# Patient Record
Sex: FEMALE | Race: WHITE
[De-identification: names, ages, dates, MRNs, and addresses within clinical notes are randomized per-mention and may not be internally consistent; named-entity substitution may affect disease eponyms.]

---

## 2017-04-17 ENCOUNTER — HOSPITAL ENCOUNTER (INPATIENT)
Dept: HOSPITAL 45 - C.EDC | Age: 67
LOS: 4 days | Discharge: NURSING, REHAB OR LONG-TERM CARE | DRG: 470 | End: 2017-04-21
Attending: HOSPITALIST | Admitting: HOSPITALIST
Payer: COMMERCIAL

## 2017-04-17 VITALS
TEMPERATURE: 97.7 F | OXYGEN SATURATION: 99 % | DIASTOLIC BLOOD PRESSURE: 79 MMHG | SYSTOLIC BLOOD PRESSURE: 190 MMHG | HEART RATE: 69 BPM

## 2017-04-17 VITALS
TEMPERATURE: 97.52 F | SYSTOLIC BLOOD PRESSURE: 172 MMHG | HEART RATE: 72 BPM | OXYGEN SATURATION: 95 % | DIASTOLIC BLOOD PRESSURE: 94 MMHG

## 2017-04-17 VITALS
BODY MASS INDEX: 30.11 KG/M2 | HEIGHT: 64 IN | BODY MASS INDEX: 30.11 KG/M2 | HEIGHT: 64 IN | WEIGHT: 176.37 LBS | WEIGHT: 176.37 LBS

## 2017-04-17 VITALS — DIASTOLIC BLOOD PRESSURE: 78 MMHG | SYSTOLIC BLOOD PRESSURE: 170 MMHG

## 2017-04-17 DIAGNOSIS — F32.9: ICD-10-CM

## 2017-04-17 DIAGNOSIS — Z90.710: ICD-10-CM

## 2017-04-17 DIAGNOSIS — G40.909: ICD-10-CM
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
## 2017-04-17 DIAGNOSIS — Z79.899: ICD-10-CM

## 2017-04-17 DIAGNOSIS — S72.141A: Primary | ICD-10-CM

## 2017-04-17 LAB
ANION GAP SERPL CALC-SCNC: 12 MMOL/L (ref 3–11)
APPEARANCE UR: (no result)
BASOPHILS # BLD: 0.03 K/UL (ref 0–0.2)
BASOPHILS NFR BLD: 0.2 %
BILIRUB UR-MCNC: (no result) MG/DL
BUN SERPL-MCNC: 18 MG/DL (ref 7–18)
BUN/CREAT SERPL: 15.4 (ref 10–20)
CALCIUM SERPL-MCNC: 8.9 MG/DL (ref 8.5–10.1)
CHLORIDE SERPL-SCNC: 105 MMOL/L (ref 98–107)
CO2 SERPL-SCNC: 23 MMOL/L (ref 21–32)
COLOR UR: YELLOW
COMPLETE: YES
CREAT CL PREDICTED SERPL C-G-VRATE: 48.5 ML/MIN
CREAT SERPL-MCNC: 1.2 MG/DL (ref 0.6–1.2)
EOSINOPHIL NFR BLD AUTO: 228 K/UL (ref 130–400)
GLUCOSE SERPL-MCNC: 194 MG/DL (ref 70–99)
HCT VFR BLD CALC: 38.3 % (ref 37–47)
IG%: 0.5 %
IMM GRANULOCYTES NFR BLD AUTO: 9.5 %
INR PPP: 0.9 (ref 0.9–1.1)
LYMPHOCYTES # BLD: 1.42 K/UL (ref 1.2–3.4)
MANUAL MICROSCOPIC REQUIRED?: NO
MCH RBC QN AUTO: 29.2 PG (ref 25–34)
MCHC RBC AUTO-ENTMCNC: 34.7 G/DL (ref 32–36)
MCV RBC AUTO: 84.2 FL (ref 80–100)
MONOCYTES NFR BLD: 7.6 %
NEUTROPHILS # BLD AUTO: 0.7 %
NEUTROPHILS NFR BLD AUTO: 81.5 %
NITRITE UR QL STRIP: (no result)
PARTIAL THROMBOPLASTIN RATIO: 1
PH UR STRIP: 8 [PH] (ref 4.5–7.5)
PMV BLD AUTO: 9.1 FL (ref 7.4–10.4)
POTASSIUM SERPL-SCNC: 3.7 MMOL/L (ref 3.5–5.1)
PROTHROMBIN TIME: 10.1 SECONDS (ref 9–12)
RBC # BLD AUTO: 4.55 M/UL (ref 4.2–5.4)
REVIEW REQ?: NO
SODIUM SERPL-SCNC: 140 MMOL/L (ref 136–145)
SP GR UR STRIP: 1.01 (ref 1–1.03)
URINE BILL WITH OR WITHOUT MIC: (no result)
UROBILINOGEN UR-MCNC: (no result) MG/DL
WBC # BLD AUTO: 15.01 K/UL (ref 4.8–10.8)
ZZURINE CULT IF INDIC CATH: NO

## 2017-04-17 RX ADMIN — MORPHINE SULFATE PRN MG: 4 INJECTION, SOLUTION INTRAMUSCULAR; INTRAVENOUS at 23:15

## 2017-04-17 NOTE — DIAGNOSTIC IMAGING REPORT
PELVIS 1 OR 2 VIEW ROUTINE



CLINICAL HISTORY: Pelvic and right hip pain. Trauma.    



COMPARISON STUDY:  7/24/2016



FINDINGS: There is an acute intertrochanteric right hip fracture with mild varus

angulation



IMPRESSION:  Acute intertrochanteric right hip fracture. 









Electronically signed by:  Yasir Foster M.D.

4/17/2017 8:27 PM



Dictated Date/Time:  4/17/2017 8:27 PM

## 2017-04-17 NOTE — DIAGNOSTIC IMAGING REPORT
CHEST ONE VIEW PORTABLE



CLINICAL HISTORY: Hip pain. Trauma.    



COMPARISON STUDY:  9/24/2015



FINDINGS: The heart is at the upper limits of normal in size. Mild superior

mediastinal prominence is likely secondary to the AP portable technique. There

is no focal pulmonary consolidation. There is no failure. There are no pleural

effusions. No pneumothorax is visualized.[ 



IMPRESSION: No active disease in the chest.







Electronically signed by:  Yasir Foster M.D.

4/17/2017 8:27 PM



Dictated Date/Time:  4/17/2017 8:26 PM

## 2017-04-17 NOTE — DIAGNOSTIC IMAGING REPORT
RIGHT FEMUR 2 VIEWS ROUTINE



CLINICAL HISTORY: Right femur pain. Trauma.     



COMPARISON: None.



DISCUSSION: There is an acute intertrochanteric right hip fracture. The fracture

is comminuted and mildly displaced. There is varus angulation at the fracture

site. No additional femoral fractures are visualized.    



IMPRESSION: Comminuted displaced acute intertrochanteric right hip fracture.







Electronically signed by:  Yasir Foster M.D.

4/17/2017 8:28 PM



Dictated Date/Time:  4/17/2017 8:28 PM

## 2017-04-17 NOTE — HISTORY AND PHYSICAL
History & Physical


Date & Time of Service:


Apr 17, 2017 at 21:59


Chief Complaint:


Fall, Hip Pain


Primary Care Physician:


Maribel Fragoso C.RAnaNAnaPAna


History of Present Illness


Source:  patient, family


The patient is a 66-year-old female who presents to the emergency department 

complaining of severe right hip pain that began immediately after a fall when 

jogging a few hours prior to arrival.  She has no other complaint of pains for 

injury post fall.  This was a mechanical fall, the patient denies being 

lightheaded or dizzy or having palpitations prior to the event.





Past Medical/Surgical History


Medical Problems:


(1) Depression


Status: Chronic  











Family History





Patient reports no known family medical history.





Social History


Smokeless Tobacco Use:  No


Alcohol Use:  none


Drug Use:  none





Multi-Drug Resistant Organisms


History of MDRO:  No





Allergies


Coded Allergies:  


     No Known Allergies (Unverified , 9/26/08)





Home Medications


Scheduled


Fluoxetine (Prozac), 100 MG PO HS


Homeopathic Products (Zicam Cold Remedy), 1 TAB PO prn


Lamotrigine (Lamictal), 100 MG PO HS


Naproxen (Aleve), 440 MG PO prn





Review of Systems


The patient denies chest pain, palpitations, shortness of breath, cough,  

vision change, hearing change, sore throat, fevers, chills, sweats, weight 

change, fatigue, nausea, vomiting, abdominal pain, pelvic pain, blood in urine 

or stool, dysuria, urinary frequency or urgency, lightheadedness, dizziness, 

headache, memory loss, rash, abnormal bruising or bleeding, imbalance, 

generalized weakness, numbness or tingling in arms , arthralgias or myalgias, 

back or neck pain, night sweats, or allergy symptoms.


The review of systems is otherwise negative other than for that already noted 

above, and at least 10 systems have been reviewed.





Physical Exam


Vital Signs











  Date Time  Temp Pulse Resp B/P Pulse Ox O2 Delivery O2 Flow Rate FiO2


 


4/17/17 21:55  72 20 172/94 99 Room Air  


 


4/17/17 19:19 36.4 71 20 170/87 95 Room Air  








The patient is awake, well-developed and adequately nourished, alert and 

oriented 3, normocephalic and atraumatic, lying in bed and in mild to moderate 

acute distress secondary to hip pain.


HEENT--PERRL, EOMI, mucous membranes  and oropharynx normal.


Neck--supple, no JVD or bruits, thyroid normal, trachea midline, no adenopathy.


Heart--normal S1 and S2, no extra beats, no murmurs, rubs or gallops.


Lungs--clear bilaterally with good air movement, no respiratory distress, no 

accessory muscle use.


Abdomen--normal bowel sounds and soft, nontender and nondistended, no hernias 

or masses,  no organomegaly.


Extremities--no cyanosis, clubbing or edema. There are good distal pulses b/l.


Dermatologic--normal skin turgor, normal color, warm and dry, no abnormal lymph 

nodes, no rash.


Neurologic--cranial nerves II through XII grossly intact, motor and sensory 

examination normal.


Rheumatologic--painful right hip decreased range of motion with foot deviated 

outward


Psychiatric--normal affect.





Diagnostics


Laboratory Results





Results Past 24 Hours








Test


  4/17/17


20:45 4/17/17


20:48 Range/Units


 


 


Urine Color YELLOW   


 


Urine Appearance CLOUDY  CLEAR  


 


Urine pH 8.0  4.5-7.5  


 


Urine Specific Gravity 1.015  1.000-1.030  


 


Urine Protein NEG  NEG  


 


Urine Glucose (UA) NEG  NEG  


 


Urine Ketones NEG  NEG  


 


Urine Occult Blood NEG  NEG  


 


Urine Nitrite NEG  NEG  


 


Urine Bilirubin NEG  NEG  


 


Urine Urobilinogen NEG  NEG  


 


Urine Leukocyte Esterase NEG  NEG  


 


Urine WBC (Auto) 0  0-5  /hpf


 


Urine RBC (Auto) 0-4  0-4  /hpf


 


Urine Hyaline Casts (Auto) 1-5  0-5  /lpf


 


Urine Epithelial Cells (Auto) 10-20  0-5  /lpf


 


Urine Bacteria (Auto) NEG  NEG  


 


White Blood Count  15.01 4.8-10.8  K/uL


 


Red Blood Count  4.55 4.2-5.4  M/uL


 


Hemoglobin  13.3 12.0-16.0  g/dL


 


Hematocrit  38.3 37-47  %


 


Mean Corpuscular Volume  84.2   fL


 


Mean Corpuscular Hemoglobin  29.2 25-34  pg


 


Mean Corpuscular Hemoglobin


Concent 


  34.7


  32-36  g/dl


 


 


Platelet Count  228 130-400  K/uL


 


Mean Platelet Volume  9.1 7.4-10.4  fL


 


Neutrophils (%) (Auto)  81.5  %


 


Lymphocytes (%) (Auto)  9.5  %


 


Monocytes (%) (Auto)  7.6  %


 


Eosinophils (%) (Auto)  0.7  %


 


Basophils (%) (Auto)  0.2  %


 


Neutrophils # (Auto)  12.25 1.4-6.5  K/uL


 


Lymphocytes # (Auto)  1.42 1.2-3.4  K/uL


 


Monocytes # (Auto)  1.14 0.11-0.59  K/uL


 


Eosinophils # (Auto)  0.10 0-0.5  K/uL


 


Basophils # (Auto)  0.03 0-0.2  K/uL


 


RDW Standard Deviation  39.6 36.4-46.3  fL


 


RDW Coefficient of Variation  13.0 11.5-14.5  %


 


Immature Granulocyte % (Auto)  0.5  %


 


Immature Granulocyte # (Auto)  0.07 0.00-0.02  K/uL


 


Prothrombin Time


  


  10.1


  9.0-12.0


SECONDS


 


Prothromb Time International


Ratio 


  0.9


  0.9-1.1  


 


 


Activated Partial


Thromboplast Time 


  25.0


  21.0-31.0


SECONDS


 


Partial Thromboplastin Ratio  1.0  











Diagnostic Radiology





                                                                               

                                                                 


Patient Name: ANKITA BAHENA                               Unit Number:  

[id number]                  


 








 











Dictated: 04/17/17 2028


 


Transcribed: 04/17/17 2028


 


ARG


 


Printed Date/Time: [~ rep prt dt]/[~ rep prt tm]








 [~ rep ct labl] - [~ rep ct ivnm]


 





   [facility]


 Radiology Department


 [place] [address]


 [phone]





 











Dictated: 04/17/17 2028


 


Transcribed: 04/17/17 2028


 


ARG


 


Printed Date/Time: [~ rep prt dt]/[~ rep prt tm]








 [~ rep ct labl] - [~ rep ct ivnm]


 








[~ rep ct add3]]


RIGHT FEMUR 2 VIEWS ROUTINE





CLINICAL HISTORY: Right femur pain. Trauma.     





COMPARISON: None.





DISCUSSION: There is an acute intertrochanteric right hip fracture. The fracture


is comminuted and mildly displaced. There is varus angulation at the fracture


site. No additional femoral fractures are visualized.    





IMPRESSION: Comminuted displaced acute intertrochanteric right hip fracture.











Electronically signed by:  Yasir Foster M.D.


4/17/2017 8:28 PM





Dictated Date/Time:  4/17/2017 8:28 PM





 The status of this report is Signed.   


 Draft = Not yet reviewed or approved by Radiologist.  


 Signed = Reviewed and approved by Radiologist.


<AttendingPhy></AttendingPhy> <FamilyPhy>Maribel Fragoso C.R.N.P.</FamilyPhy> 

<PrimaryPhy>Maribel Fragoso C.R.NAnaP.</PrimaryPhy> <UnitNumber>[id number]</

UnitNumber> <VisitNumber>[id number]</VisitNumber> <PatientName>ANKITA BAHENA<

/PatientName> <DateOfBirth>1950</DateOfBirth> <Location>[facility]</Location> <

ServiceDate>04/17/17</ServiceDate> <MNE>ESINDI</MNE> <OrderingPhy>Puneet Cuello DO</OrderingPhy> <OrderingPhyMNE>f rep ord dr acevedo</OrderingPhyMNE> <

DictatingPhyMNE>f rep dict dr acevedo</DictatingPhyMNE> <CCListMNE>f rep ct mne</

CCListMNE> <AdmittingPhyMNE>f pt admit dr acevedo</AdmittingPhyMNE> <AttendingPhyMNE

>f pt attend dr acevedo</AttendingPhyMNE>


<ConsultingPhyMNE>f pt consult dr acevedo</ConsultingPhyMNE> <FamilyPhyMNE>f pt fam 

dr acevedo</FamilyPhyMNE> <OtherPhyMNE>f pt other dr acevedo</OtherPhyMNE> <

PrimaryPhyMNE>f pt prim care dr acevedo</PrimaryPhyMNE> <ReferringPhyMNE>f pt 

referring dr acevedo</ReferringPhyMNE>





                                                                               

                                                                 


Patient Name: ANKITA BAHENA                               Unit Number:  

[id number]                  


 








 











Dictated: 04/17/17 2026


 


Transcribed: 04/17/17 2026


 


ARG


 


Printed Date/Time: [~ rep prt dt]/[~ rep prt tm]








 [~ rep ct labl] - [~ rep ct ivnm]


 





   [facility]


 Radiology Department


 [place] [address] [phone]





 











Dictated: 04/17/17 2026


 


Transcribed: 04/17/17 2026


 


ARG


 


Printed Date/Time: [~ rep prt dt]/[~ rep prt tm]








 [~ rep ct labl] - [~ rep ct ivnm]


 








CHEST ONE VIEW PORTABLE





CLINICAL HISTORY: Hip pain. Trauma.    





COMPARISON STUDY:  9/24/2015





FINDINGS: The heart is at the upper limits of normal in size. Mild superior


mediastinal prominence is likely secondary to the AP portable technique. There


is no focal pulmonary consolidation. There is no failure. There are no pleural


effusions. No pneumothorax is visualized.[ 





IMPRESSION: No active disease in the chest.











Electronically signed by:  Yasir Foster M.D.


4/17/2017 8:27 PM





Dictated Date/Time:  4/17/2017 8:26 PM





 The status of this report is Signed.   


 Draft = Not yet reviewed or approved by Radiologist.  


 Signed = Reviewed and approved by Radiologist.


<AttendingPhy></AttendingPhy> <FamilyPhy>Maribel Fragoso, C.R.N.P.</FamilyPhy> 

<PrimaryPhy>Maribel Fragoso, C.R.N.P.</PrimaryPhy> <UnitNumber>[id number]</

UnitNumber> <VisitNumber>[id number]</VisitNumber> <PatientName>ANKITA BAHENA<

/PatientName> <DateOfBirth>1950</DateOfBirth> <Location>[facility]</Location> <

ServiceDate>04/17/17</ServiceDate> <MNE>ESINDI</MNE> <OrderingPhy>Puneet Cuello DO</OrderingPhy> <OrderingPhyMNE>f rep ord dr acevedo</OrderingPhyMNE> <

DictatingPhyMNE>f rep dict dr acevedo</DictatingPhyMNE> <CCListMNE>f rep ct mne</

CCListMNE> <AdmittingPhyMNE>f pt admit dr acevedo</AdmittingPhyMNE> <AttendingPhyMNE

>f pt attend dr acevedo</AttendingPhyMNE>


<ConsultingPhyMNE>f pt consult dr acevedo</ConsultingPhyMNE> <FamilyPhyMNE>f pt fam 

dr acevedo</FamilyPhyMNE> <OtherPhyMNE>f pt other dr acevedo</OtherPhyMNE> <

PrimaryPhyMNE>f pt prim care dr acevedo</PrimaryPhyMNE> <ReferringPhyMNE>f pt 

referring dr acevedo</ReferringPhyMNE>





                                                                               

                                                                 


Patient Name: ANKITA BAHENA                               Unit Number:  

[id number]                  


 








 











Dictated: 04/17/17 2027


 


Transcribed: 04/17/17 2027


 


ARG


 


Printed Date/Time: [~ rep prt dt]/[~ rep prt tm]








 [~ rep ct labl] - [~ rep ct ivnm]


 





   [facility]


 Radiology Department


 [place] [id number] [phone]





 











Dictated: 04/17/17 2027


 


Transcribed: 04/17/17 2027


 


ARG


 


Printed Date/Time: [~ rep prt dt]/[~ rep prt tm]








 [~ rep ct labl] - [~ rep ct ivnm]


 








[~ rep ct add3]]


PELVIS 1 OR 2 VIEW ROUTINE





CLINICAL HISTORY: Pelvic and right hip pain. Trauma.    





COMPARISON STUDY:  7/24/2016





FINDINGS: There is an acute intertrochanteric right hip fracture with mild varus


angulation





IMPRESSION:  Acute intertrochanteric right hip fracture. 














Electronically signed by:  Yasir Foster M.D.


4/17/2017 8:27 PM





Dictated Date/Time:  4/17/2017 8:27 PM





 The status of this report is Signed.   


 Draft = Not yet reviewed or approved by Radiologist.  


 Signed = Reviewed and approved by Radiologist.


<AttendingPhy></AttendingPhy> <FamilyPhy>Maribel Fragoso C.R.N.P.</FamilyPhy> 

<PrimaryPhy>Maribel Fragoso C.RAnaN.P.</PrimaryPhy> <UnitNumber>[id number]</

UnitNumber> <VisitNumber>[id number]</VisitNumber> <PatientName>ANKITA BAHENA<

/PatientName> <DateOfBirth>1950</DateOfBirth> <[facility] <

ServiceDate>04/17/17</ServiceDate> <MNE>ESINDI</MNE> <OrderingPhy>CuelloPuneet ryan 

CARLY DO</OrderingPhy> <OrderingPhyMNE>f rep ord dr acevedo</OrderingPhyMNE> <

DictatingPhyMNE>f rep dict dr acevedo</DictatingPhyMNE> <CCListMNE>f rep ct mne</

CCListMNE> <AdmittingPhyMNE>f pt admit dr acevedo</AdmittingPhyMNE> <AttendingPhyMNE

>f pt attend dr acevedo</AttendingPhyMNE>


<ConsultingPhyMNE>f pt consult dr acevedo</ConsultingPhyMNE> <FamilyPhyMNE>f pt fam 

dr acevedo</FamilyPhyMNE> <OtherPhyMNE>f pt other dr acevedo</OtherPhyMNE> <

PrimaryPhyMNE>f pt prim care dr acevedo</PrimaryPhyMNE> <ReferringPhyMNE>f pt 

referring dr acevedo</ReferringPhyMNE>





EKG


EKG shows normal sinus rhythm at 78 bpm, there are no acute ST-T changes.





Impression


Assessment and Plan


Closed right hip fracture--the patient be admitted to the medical surgical 

floor.  She'll be made nothing by mouth after midnight.  Place on Zofran 4 mg 

IV every 6 hours when necessary, Protonix 40 mg IV daily, and morphine sulfate 2

-4 mg IV every 2 hours when necessary.  Consult Dr. Rudy Gomez from 

orthopedics.  Place on normal saline with KCl 20 mEq at 100 ML's per hour.





Depression--continue fluoxetine 100 mg by mouth at bedtime and lamotrigine 100 

mg by mouth at bedtime.





Level of Care


Med/Surg





Advanced Directives


Existing Advance Directive:  No


Existing Living Will:  No


Existing Power of :  No





Resuscitation Status


FULL RESUSCITATION





VTE Prophylaxis


VTE Risk Assessment Done? Y/N:  Yes


Risk Level:  Moderate


Given or contraindicated:  SCD's

## 2017-04-17 NOTE — EMERGENCY ROOM VISIT NOTE
History


Report prepared by Navneet:  Janie Shay


Under the Supervision of:  Dr. Puneet Cuello D.O.


First contact with patient:  19:18


Chief Complaint:  HIP PAIN


Stated Complaint:  FALL, HIP PAIN





History of Present Illness


The patient is a 66 year old female who presents to the Emergency Room with 

complaints of severe and persistent left hip pain starting a few hours ago. The 

patient was jogging when she fell and landed on her left hip. She denies 

hitting her head. She has been having the pain since her fall. She has 

worsening pain with movement. Pt denies headache, change in vision, neck pain, 

fevers, chest pain, shortness of breath, nausea, vomiting, diarrhea, pain with 

urination, and melena. She denies any blood thinners. She does not have any 

medical problems.





   Source of History:  patient


   Onset:  a few hours ago


   Position:  other (left hip)


   Symptom Intensity:  severe


   Timing:  other (persistent)


   Modifying Factors (Worsening):  movement


   Associated Symptoms:  No SOB, No chest pain, No diarrhea, No fevers, No 

headache, No nausea, No neck pain, No vomiting





Review of Systems


See HPI for pertinent positives & negatives. A total of 10 systems reviewed and 

were otherwise negative.





Past Medical & Surgical


Medical Problems:


(1) Closed right hip fracture


(2) Depression








Family History





Patient reports no known family medical history.





Social History


Marital Status:  


Occupation Status:  retired





Current/Historical Medications


Scheduled


Fluoxetine (Prozac), 100 MG PO HS


Homeopathic Products (Zicam Cold Remedy), 1 TAB PO prn


Lamotrigine (Lamictal), 100 MG PO HS


Naproxen (Aleve), 440 MG PO prn





Allergies


Coded Allergies:  


     No Known Allergies (Unverified , 9/26/08)





Physical Exam


Vital Signs











  Date Time  Temp Pulse Resp B/P Pulse Ox O2 Delivery O2 Flow Rate FiO2


 


4/17/17 21:55  72 20 172/94 99 Room Air  


 


4/17/17 19:19 36.4 71 20 170/87 95 Room Air  











Physical Exam


GENERAL: Laying on her left side, in moderate distress, holding her right hip. 


HEAD: Normocephalic, atraumatic. 


EYE EXAM: normal conjunctiva, PERRL and EOM's grossly intact


OROPHARYNX: no exudate, no erythema, lips, buccal mucosa, and tongue normal and 

mucous membranes are moist


NECK: supple, no nuchal rigidity, no adenopathy, non-tender


CHEST: stable to compression anteriorly posteriorly 


LUNGS: Clear to auscultation. Normal chest wall mechanics


HEART: no murmurs, S1 normal and S2 normal 


ABDOMEN: abdomen soft, non-tender, normo-active bowel sounds, no masses, no 

rebound or guarding. 


BACK: Back is symmetrical on inspection and there is no deformity, no midline 

tenderness, no CVA tenderness. 


SKIN: Small abrasions on bilateral lower extremities


UPPER EXTREMITIES: upper extremities are grossly normal. 


LOWER EXTREMITIES: No pitting edema. Right lower extremity with significant 

pain with palpation of the hip. DP 2/4. Gross sensations intact. Right lower 

extremity is shortened. Small abrasions on bilateral feet.  No pain on 

palpation of the left lower extremity.


NEURO EXAM: Normal sensorium, cranial nerves II-XII grossly intact, normal 

speech,  no gross weakness of arms, no gross weakness of legs.





Medical Decision & Procedures


ER Provider


Diagnostic Interpretation:


Xray results as interpreted by me and the radiologist:





CHEST ONE VIEW PORTABLE





CLINICAL HISTORY: Hip pain. Trauma.    





COMPARISON STUDY:  9/24/2015





FINDINGS: The heart is at the upper limits of normal in size. Mild superior


mediastinal prominence is likely secondary to the AP portable technique. There


is no focal pulmonary consolidation. There is no failure. There are no pleural


effusions. No pneumothorax is visualized.[ 





IMPRESSION: No active disease in the chest.











Electronically signed by:  Yasir Foster M.D.


4/17/2017 8:27 PM





Dictated Date/Time:  4/17/2017 8:26 PM








PELVIS 1 OR 2 VIEW ROUTINE





CLINICAL HISTORY: Pelvic and right hip pain. Trauma.    





COMPARISON STUDY:  7/24/2016





FINDINGS: There is an acute intertrochanteric right hip fracture with mild varus


angulation





IMPRESSION:  Acute intertrochanteric right hip fracture. 














Electronically signed by:  Yasir Foster M.D.


4/17/2017 8:27 PM





Dictated Date/Time:  4/17/2017 8:27 PM








RIGHT FEMUR 2 VIEWS ROUTINE





CLINICAL HISTORY: Right femur pain. Trauma.     





COMPARISON: None.





DISCUSSION: There is an acute intertrochanteric right hip fracture. The fracture


is comminuted and mildly displaced. There is varus angulation at the fracture


site. No additional femoral fractures are visualized.    





IMPRESSION: Comminuted displaced acute intertrochanteric right hip fracture.











Electronically signed by:  Yasir Foster M.D.


4/17/2017 8:28 PM





Dictated Date/Time:  4/17/2017 8:28 PM





Laboratory Results


4/17/17 20:48








Red Blood Count 4.55, Mean Corpuscular Volume 84.2, Mean Corpuscular Hemoglobin 

29.2, Mean Corpuscular Hemoglobin Concent 34.7, Mean Platelet Volume 9.1, 

Neutrophils (%) (Auto) 81.5, Lymphocytes (%) (Auto) 9.5, Monocytes (%) (Auto) 

7.6, Eosinophils (%) (Auto) 0.7, Basophils (%) (Auto) 0.2, Neutrophils # (Auto) 

12.25, Lymphocytes # (Auto) 1.42, Monocytes # (Auto) 1.14, Eosinophils # (Auto) 

0.10, Basophils # (Auto) 0.03





4/17/17 20:48

















Test


  4/17/17


20:45 4/17/17


20:48


 


Urine Color YELLOW  


 


Urine Appearance CLOUDY (CLEAR)  


 


Urine pH 8.0 (4.5-7.5)  


 


Urine Specific Gravity


  1.015


(1.000-1.030) 


 


 


Urine Protein NEG (NEG)  


 


Urine Glucose (UA) NEG (NEG)  


 


Urine Ketones NEG (NEG)  


 


Urine Occult Blood NEG (NEG)  


 


Urine Nitrite NEG (NEG)  


 


Urine Bilirubin NEG (NEG)  


 


Urine Urobilinogen NEG (NEG)  


 


Urine Leukocyte Esterase NEG (NEG)  


 


Urine WBC (Auto) 0 /hpf (0-5)  


 


Urine RBC (Auto) 0-4 /hpf (0-4)  


 


Urine Hyaline Casts (Auto) 1-5 /lpf (0-5)  


 


Urine Epithelial Cells (Auto)


  10-20 /lpf


(0-5) 


 


 


Urine Bacteria (Auto) NEG (NEG)  


 


White Blood Count


  


  15.01 K/uL


(4.8-10.8)


 


Red Blood Count


  


  4.55 M/uL


(4.2-5.4)


 


Hemoglobin


  


  13.3 g/dL


(12.0-16.0)


 


Hematocrit  38.3 % (37-47) 


 


Mean Corpuscular Volume


  


  84.2 fL


()


 


Mean Corpuscular Hemoglobin


  


  29.2 pg


(25-34)


 


Mean Corpuscular Hemoglobin


Concent 


  34.7 g/dl


(32-36)


 


Platelet Count


  


  228 K/uL


(130-400)


 


Mean Platelet Volume


  


  9.1 fL


(7.4-10.4)


 


Neutrophils (%) (Auto)  81.5 % 


 


Lymphocytes (%) (Auto)  9.5 % 


 


Monocytes (%) (Auto)  7.6 % 


 


Eosinophils (%) (Auto)  0.7 % 


 


Basophils (%) (Auto)  0.2 % 


 


Neutrophils # (Auto)


  


  12.25 K/uL


(1.4-6.5)


 


Lymphocytes # (Auto)


  


  1.42 K/uL


(1.2-3.4)


 


Monocytes # (Auto)


  


  1.14 K/uL


(0.11-0.59)


 


Eosinophils # (Auto)


  


  0.10 K/uL


(0-0.5)


 


Basophils # (Auto)


  


  0.03 K/uL


(0-0.2)


 


RDW Standard Deviation


  


  39.6 fL


(36.4-46.3)


 


RDW Coefficient of Variation


  


  13.0 %


(11.5-14.5)


 


Immature Granulocyte % (Auto)  0.5 % 


 


Immature Granulocyte # (Auto)


  


  0.07 K/uL


(0.00-0.02)


 


Prothrombin Time


  


  10.1 SECONDS


(9.0-12.0)


 


Prothromb Time International


Ratio 


  0.9 (0.9-1.1) 


 


 


Activated Partial


Thromboplast Time 


  25.0 SECONDS


(21.0-31.0)


 


Partial Thromboplastin Ratio  1.0 


 


Anion Gap


  


  12.0 mmol/L


(3-11)


 


Est Creatinine Clear Calc


Drug Dose 


  48.5 ml/min 


 


 


Estimated GFR (


American) 


  55.3 


 


 


Estimated GFR (Non-


American 


  47.7 


 


 


BUN/Creatinine Ratio  15.4 (10-20) 


 


Calcium Level


  


  8.9 mg/dl


(8.5-10.1)














Laboratory results per my review.





Medications Administered











 Medications


  (Trade)  Dose


 Ordered  Sig/Saima


 Route  Start Time


 Stop Time Status Last Admin


Dose Admin


 


 Ondansetron HCl


  (Zofran Inj)  4 mg  NOW  STAT


 IV  4/17/17 19:24


 4/17/17 19:27 DC 4/17/17 19:32


4 MG


 


 Morphine Sulfate


  (MoRPHine


 SULFATE INJ)  2 mg  STK-MED ONCE


 .ROUTE  4/17/17 19:35


 4/17/17 19:36 DC 4/17/17 19:32


2 MG


 


 Morphine Sulfate


  (MoRPHine


 SULFATE INJ)  4 mg  STK-MED ONCE


 .ROUTE  4/17/17 19:36


 4/17/17 19:37 DC 4/17/17 19:31


4 MG











ED Course


ED COURSE: 


Vital signs were reviewed and showed hypertensive. 


The patients medical record was reviewed


The above diagnostic studies were performed and reviewed.


ED treatments and interventions as stated above. 





1918: The patient was evaluated in room C04. A complete history and physical 

examination was performed.





2024: Upon reevaluation, the patient is resting comfortably.I discussed my 

findings with the patient and she understands and agrees with the treatment 

plan.   


Based on the patients age, coexisting illnesses, exam and lab findings the 

decision to treat as an inpatient was made.


The patient remained stable while under my care.


The patient will be evaluated for further management.





2032: I discussed the patient's case with Dr. Gomez, orthopedic surgeon with 

[facility]. 





2044: I discussed the patient's case with Dr. Drummond, from [facility].





Medical Decision


Differential diagnoses include major intracranial, cervical, spinal, thoracic, 

abdominal, pelvic and neurologic injury.  Fracture, contusion, sprain, strain, 

laceration, abrasions included as well. 





Patient is a 66-year-old female who presents to the ER following a mechanical 

fall walking. Denies any trauma to her head or any other complaints.  No blood 

thinners.  She is in significant pain in her right hip.  X-ray show right hip 

fracture.  Discussed this with orthopedics.  Labs were obtained per the hip 

fracture protocol.  White count was 15,000 likely secondary to pain.  BMP was 

unremarkable.  UA was unremarkable.  INR was normal.  Patient was given IV 

morphine and admitted to internal medicine for right hip fx.





Consults


Time Called:  2023


Consulting Physician:  Dr. Gomez, orthopedic surgeon with [facility]


Returned Call:  2032


I discussed the patient's case with Dr. Gomez, orthopedic surgeon with 

[facility].


Additional Consults:  


   Time Called:  2040


   Consulted Physician:  Dr. Drummond, from [facility]


   Returned Call:  2044


Additional Comments:


I discussed the patient's case with Dr. Drummond, from [facility].





Impression





 Primary Impression:  


 Hip fracture, right





Scribe Attestation


The scribe's documentation has been prepared under my direction and personally 

reviewed by me in its entirety. I confirm that the note above accurately 

reflects all work, treatment, procedures, and medical decision making performed 

by me.





Departure Information


Dispostion


Being Evaluated By Hospitalist





Maribel Crockett C.R.NAnaPAna (PCP)





Patient Instructions


My [facility]





Problem Qualifiers








 Primary Impression:  


 Hip fracture, right


 Encounter type:  initial encounter  Fracture type:  closed  Qualified Codes:  

S72.001A - Fracture of unspecified part of neck of right femur, initial 

encounter for closed fracture

## 2017-04-18 VITALS — HEART RATE: 85 BPM | DIASTOLIC BLOOD PRESSURE: 79 MMHG | SYSTOLIC BLOOD PRESSURE: 158 MMHG

## 2017-04-18 VITALS
DIASTOLIC BLOOD PRESSURE: 84 MMHG | OXYGEN SATURATION: 97 % | SYSTOLIC BLOOD PRESSURE: 162 MMHG | HEART RATE: 83 BPM | TEMPERATURE: 98.06 F

## 2017-04-18 VITALS
TEMPERATURE: 97.88 F | SYSTOLIC BLOOD PRESSURE: 185 MMHG | DIASTOLIC BLOOD PRESSURE: 93 MMHG | OXYGEN SATURATION: 97 % | HEART RATE: 96 BPM

## 2017-04-18 VITALS
HEART RATE: 90 BPM | DIASTOLIC BLOOD PRESSURE: 74 MMHG | OXYGEN SATURATION: 95 % | TEMPERATURE: 97.52 F | SYSTOLIC BLOOD PRESSURE: 121 MMHG

## 2017-04-18 VITALS
SYSTOLIC BLOOD PRESSURE: 177 MMHG | OXYGEN SATURATION: 95 % | TEMPERATURE: 97.88 F | DIASTOLIC BLOOD PRESSURE: 92 MMHG | HEART RATE: 98 BPM

## 2017-04-18 VITALS
DIASTOLIC BLOOD PRESSURE: 108 MMHG | TEMPERATURE: 98.42 F | HEART RATE: 102 BPM | SYSTOLIC BLOOD PRESSURE: 183 MMHG | OXYGEN SATURATION: 96 %

## 2017-04-18 VITALS
DIASTOLIC BLOOD PRESSURE: 84 MMHG | HEART RATE: 94 BPM | TEMPERATURE: 97.88 F | SYSTOLIC BLOOD PRESSURE: 164 MMHG | OXYGEN SATURATION: 95 %

## 2017-04-18 VITALS
TEMPERATURE: 97.52 F | OXYGEN SATURATION: 98 % | DIASTOLIC BLOOD PRESSURE: 95 MMHG | SYSTOLIC BLOOD PRESSURE: 175 MMHG | HEART RATE: 96 BPM

## 2017-04-18 LAB
ANION GAP SERPL CALC-SCNC: 8 MMOL/L (ref 3–11)
BASOPHILS # BLD: 0.03 K/UL (ref 0–0.2)
BASOPHILS NFR BLD: 0.3 %
BUN SERPL-MCNC: 16 MG/DL (ref 7–18)
BUN/CREAT SERPL: 14.8 (ref 10–20)
CALCIUM SERPL-MCNC: 8.7 MG/DL (ref 8.5–10.1)
CHLORIDE SERPL-SCNC: 107 MMOL/L (ref 98–107)
CO2 SERPL-SCNC: 26 MMOL/L (ref 21–32)
COMPLETE: YES
CREAT CL PREDICTED SERPL C-G-VRATE: 52.9 ML/MIN
CREAT SERPL-MCNC: 1.1 MG/DL (ref 0.6–1.2)
EOSINOPHIL NFR BLD AUTO: 223 K/UL (ref 130–400)
GLUCOSE SERPL-MCNC: 141 MG/DL (ref 70–99)
HCT VFR BLD CALC: 36.3 % (ref 37–47)
IG%: 0.4 %
IMM GRANULOCYTES NFR BLD AUTO: 11 %
LYMPHOCYTES # BLD: 1.25 K/UL (ref 1.2–3.4)
MAGNESIUM SERPL-MCNC: 2.4 MG/DL (ref 1.8–2.4)
MCH RBC QN AUTO: 28.7 PG (ref 25–34)
MCHC RBC AUTO-ENTMCNC: 33.3 G/DL (ref 32–36)
MCV RBC AUTO: 86.2 FL (ref 80–100)
MONOCYTES NFR BLD: 11.6 %
NEUTROPHILS # BLD AUTO: 0.2 %
NEUTROPHILS NFR BLD AUTO: 76.5 %
PMV BLD AUTO: 9.1 FL (ref 7.4–10.4)
POTASSIUM SERPL-SCNC: 4.2 MMOL/L (ref 3.5–5.1)
RBC # BLD AUTO: 4.21 M/UL (ref 4.2–5.4)
SODIUM SERPL-SCNC: 141 MMOL/L (ref 136–145)
WBC # BLD AUTO: 11.34 K/UL (ref 4.8–10.8)

## 2017-04-18 PROCEDURE — 0SR90J9 REPLACEMENT OF RIGHT HIP JOINT WITH SYNTHETIC SUBSTITUTE, CEMENTED, OPEN APPROACH: ICD-10-PCS | Performed by: ORTHOPAEDIC SURGERY

## 2017-04-18 RX ADMIN — TRANEXAMIC ACID SCH MLS/HR: 100 INJECTION, SOLUTION INTRAVENOUS at 18:05

## 2017-04-18 RX ADMIN — SODIUM CHLORIDE AND POTASSIUM CHLORIDE SCH MLS/HR: 9; 1.49 INJECTION, SOLUTION INTRAVENOUS at 08:57

## 2017-04-18 RX ADMIN — SODIUM CHLORIDE AND POTASSIUM CHLORIDE SCH MLS/HR: 9; 1.49 INJECTION, SOLUTION INTRAVENOUS at 19:07

## 2017-04-18 RX ADMIN — LORAZEPAM PRN MLS/MIN: 2 INJECTION INTRAMUSCULAR; INTRAVENOUS at 21:49

## 2017-04-18 RX ADMIN — MORPHINE SULFATE PRN MG: 4 INJECTION, SOLUTION INTRAMUSCULAR; INTRAVENOUS at 07:55

## 2017-04-18 RX ADMIN — FLUOXETINE SCH MG: 20 CAPSULE ORAL at 21:16

## 2017-04-18 RX ADMIN — LORAZEPAM PRN MLS/MIN: 2 INJECTION INTRAMUSCULAR; INTRAVENOUS at 10:50

## 2017-04-18 RX ADMIN — TRANEXAMIC ACID SCH MLS/HR: 100 INJECTION, SOLUTION INTRAVENOUS at 18:06

## 2017-04-18 RX ADMIN — OXYCODONE HYDROCHLORIDE PRN MG: 5 TABLET ORAL at 18:25

## 2017-04-18 RX ADMIN — MORPHINE SULFATE PRN MG: 4 INJECTION, SOLUTION INTRAMUSCULAR; INTRAVENOUS at 03:03

## 2017-04-18 RX ADMIN — MORPHINE SULFATE PRN MG: 4 INJECTION, SOLUTION INTRAMUSCULAR; INTRAVENOUS at 00:49

## 2017-04-18 RX ADMIN — SODIUM CHLORIDE AND POTASSIUM CHLORIDE SCH MLS/HR: 9; 1.49 INJECTION, SOLUTION INTRAVENOUS at 00:31

## 2017-04-18 RX ADMIN — HYDROMORPHONE HYDROCHLORIDE PRN MG: 1 INJECTION, SOLUTION INTRAMUSCULAR; INTRAVENOUS; SUBCUTANEOUS at 19:07

## 2017-04-18 RX ADMIN — HYDROMORPHONE HYDROCHLORIDE PRN MG: 1 INJECTION, SOLUTION INTRAMUSCULAR; INTRAVENOUS; SUBCUTANEOUS at 20:08

## 2017-04-18 RX ADMIN — MORPHINE SULFATE PRN MG: 4 INJECTION, SOLUTION INTRAMUSCULAR; INTRAVENOUS at 09:57

## 2017-04-18 RX ADMIN — OXYCODONE HYDROCHLORIDE PRN MG: 5 TABLET ORAL at 23:41

## 2017-04-18 RX ADMIN — PANTOPRAZOLE SODIUM SCH MLS/MIN: 40 INJECTION, POWDER, FOR SOLUTION INTRAVENOUS at 10:51

## 2017-04-18 RX ADMIN — MORPHINE SULFATE PRN MG: 4 INJECTION, SOLUTION INTRAMUSCULAR; INTRAVENOUS at 05:18

## 2017-04-18 NOTE — ANESTHESIOLOGY PROGRESS NOTE
Anesthesia Post Op Note


Date & Time


Apr 18, 2017 at 17:28





Vital Signs


Pain Intensity:  3





 Vital Signs Past 12 Hours








  Date Time  Temp Pulse Resp B/P Pulse Ox O2 Delivery O2 Flow Rate FiO2


 


4/18/17 17:20  98 18 133/73 96 Nasal Cannula 3 


 


4/18/17 17:10 36.4 97 20 119/71 96 Nasal Cannula 3 


 


4/18/17 17:00  97 18 122/65 97 Mask 10 


 


4/18/17 16:50  96 18 125/72 97 Mask 10 


 


4/18/17 16:43 36.8 95 16 110/64 98 Mask 10 


 


4/18/17 07:25 36.7 83 16 162/84 97  2.0 


 


4/18/17 07:15      Nasal Cannula 2.0 











Notes


Mental Status:  alert / awake / arousable, participated in evaluation


Pt Amnestic to Procedure:  Yes


Nausea / Vomiting:  adequately controlled


Pain:  adequately controlled


Airway Patency, RR, SpO2:  stable & adequate


BP & HR:  stable & adequate


Hydration State:  stable & adequate


Anesthetic Complications:  no major complications apparent

## 2017-04-18 NOTE — [facility] POST OPERATIVE BRIEF NOTE
Immediate Operative Summary


Operative Date


Apr 18, 2017.





Pre-Operative Diagnosis





severe right hip fracture





Post-Operative Diagnosis





severe right hip fracture





Procedure(s) Performed





Right Cemented Hip Replacement Constrained Acetabular Cup, Greater Trochater 


Repair





Surgeon


Dr. Jong Gomez





Assistant Surgeon(s)


Matthew Torres PA-C





Estimated Blood Loss


300ML





Findings


severe comminution





Specimens





A. Right femoral head





Complication(s)


None





Disposition


Recovery Room / PACU

## 2017-04-18 NOTE — HOSPITALIST PROGRESS NOTE
Hospitalist Progress Note


Date of Service


Apr 18, 2017.





Subjective


Pt evaluation today including:  conversation w/ patient


patient with post op pain at site of surgery





Medications











 Medications


  (Trade)  Dose


 Ordered  Sig/Saima


 Route  Start Time


 Stop Time Status Last Admin


Dose Admin


 


 Ondansetron HCl


  (Zofran Inj)  4 mg  NOW  STAT


 IV  4/17/17 19:24


 4/17/17 19:27 DC 4/17/17 19:32


4 MG


 


 Morphine Sulfate


  (MoRPHine


 SULFATE INJ)  2 mg  STK-MED ONCE


 .ROUTE  4/17/17 19:35


 4/17/17 19:36 DC 4/17/17 19:32


2 MG


 


 Morphine Sulfate


  (MoRPHine


 SULFATE INJ)  4 mg  STK-MED ONCE


 .ROUTE  4/17/17 19:36


 4/17/17 19:37 DC 4/17/17 19:31


4 MG


 


 Ondansetron HCl 4


 mg  4 mg  Q6H  PRN


 IV  4/17/17 22:00


 5/17/17 21:59  4/17/17 23:14


4 MG


 


 Potassium


 Chloride/Sodium


 Chloride


  (Nss + 20meq KCl


 1000ml)  1,000 ml @ 


 100 mls/hr  Q10H


 IV  4/17/17 23:15


 5/17/17 23:14  4/18/17 08:57


100 MLS/HR


 


 Morphine Sulfate


 4 mg  4 mg  Q2H  PRN


 IV  4/17/17 22:00


 4/18/17 17:56 DC 4/18/17 09:57


4 MG


 


 Pantoprazole


 Sodium 40 mg/


 Syringe  10 ml @ 5


 mls/min  DAILY@11


 IV  4/18/17 11:00


 5/18/17 10:59  4/18/17 10:51


5 MLS/MIN


 


 Lorazepam/Syringe


  (Ativan Inj/


 Syringe)  1 ml @ 0.5


 mls/min  TID  PRN


 IV  4/18/17 10:00


 5/18/17 09:59  4/18/17 10:50


0.5 MLS/MIN


 


 Cefazolin Sodium


 2000 mg  2,000 mg  STK-MED ONCE


 IV  4/18/17 13:25


 4/18/17 13:26 DC 4/18/17 13:53


2,000 MG


 


 Polymyxin B


 Sulfate 680627


 units/Sodium


 Chloride  102 ml @ 0


 mls/hr  TODAY@1400


 IR  4/18/17 14:00


 4/18/17 14:01 DC 4/18/17 16:17


102 MLS/HR


 


 Vancomycin HCl/


 Sodium Chloride


  (Vancomycin Inj/


 Nss 100ml)  108 ml @ 0


 mls/hr  TODAY@1400


 IR  4/18/17 14:00


 4/18/17 14:01 DC 4/18/17 16:15


108 MLS/HR


 


 Bacitracin


  (Bacitracin


 50,000 Units)  50,000 units  ONE  ONCE


 IR  4/18/17 16:22


 4/18/17 16:25 DC 4/18/17 16:25


50,000 UNITS











Objective


Vital Signs











  Date Time  Temp Pulse Resp B/P Pulse Ox O2 Delivery O2 Flow Rate FiO2


 


4/18/17 18:23 36.6 94 16 164/84 95 Nasal Cannula 2.0 


 


4/18/17 17:35  97 16 143/74 97 Nasal Cannula 3 


 


4/18/17 17:20  98 18 133/73 96 Nasal Cannula 3 


 


4/18/17 17:10 36.4 97 20 119/71 96 Nasal Cannula 3 


 


4/18/17 17:00  97 18 122/65 97 Mask 10 


 


4/18/17 16:50  96 18 125/72 97 Mask 10 


 


4/18/17 16:43 36.8 95 16 110/64 98 Mask 10 


 


4/18/17 07:25 36.7 83 16 162/84 97  2.0 


 


4/18/17 07:15      Nasal Cannula 2.0 


 


4/18/17 01:11  85  158/79    


 


4/17/17 23:48    170/78    


 


4/17/17 23:22      Room Air  


 


4/17/17 23:00 36.5 69 16 190/79 99 Nasal Cannula 2.0 


 


4/17/17 22:35 36.4 72 20 172/94 95 Nasal Cannula 2.0 


 


4/17/17 22:20     95 Nasal Cannula 2.0 


 


4/17/17 22:20     87 Room Air  


 


4/17/17 21:55  72 20 172/94 99 Room Air  


 


4/17/17 19:19 36.4 71 20 170/87 95 Room Air  











Physical Exam


General Appearance:  WD/WN


Eyes:  normal inspection


ENT:  hearing grossly normal


Neck:  trachea midline


Respiratory/Chest:  lungs clear


Cardiovascular:  regular rate, rhythm


Abdomen:  normal bowel sounds





Laboratory Results





Last 24 Hours








Test


  4/17/17


20:45 4/17/17


20:48 4/18/17


05:52 4/18/17


06:50


 


Urine Color YELLOW    


 


Urine Appearance CLOUDY    


 


Urine pH 8.0    


 


Urine Specific Gravity 1.015    


 


Urine Protein NEG    


 


Urine Glucose (UA) NEG    


 


Urine Ketones NEG    


 


Urine Occult Blood NEG    


 


Urine Nitrite NEG    


 


Urine Bilirubin NEG    


 


Urine Urobilinogen NEG    


 


Urine Leukocyte Esterase NEG    


 


Urine WBC (Auto) 0 /hpf    


 


Urine RBC (Auto) 0-4 /hpf    


 


Urine Hyaline Casts (Auto) 1-5 /lpf    


 


Urine Epithelial Cells (Auto) 10-20 /lpf    


 


Urine Bacteria (Auto) NEG    


 


White Blood Count  15.01 K/uL   11.34 K/uL 


 


Red Blood Count  4.55 M/uL   4.21 M/uL 


 


Hemoglobin  13.3 g/dL   12.1 g/dL 


 


Hematocrit  38.3 %   36.3 % 


 


Mean Corpuscular Volume  84.2 fL   86.2 fL 


 


Mean Corpuscular Hemoglobin  29.2 pg   28.7 pg 


 


Mean Corpuscular Hemoglobin


Concent 


  34.7 g/dl 


  


  33.3 g/dl 


 


 


Platelet Count  228 K/uL   223 K/uL 


 


Mean Platelet Volume  9.1 fL   9.1 fL 


 


Neutrophils (%) (Auto)  81.5 %   76.5 % 


 


Lymphocytes (%) (Auto)  9.5 %   11.0 % 


 


Monocytes (%) (Auto)  7.6 %   11.6 % 


 


Eosinophils (%) (Auto)  0.7 %   0.2 % 


 


Basophils (%) (Auto)  0.2 %   0.3 % 


 


Neutrophils # (Auto)  12.25 K/uL   8.69 K/uL 


 


Lymphocytes # (Auto)  1.42 K/uL   1.25 K/uL 


 


Monocytes # (Auto)  1.14 K/uL   1.31 K/uL 


 


Eosinophils # (Auto)  0.10 K/uL   0.02 K/uL 


 


Basophils # (Auto)  0.03 K/uL   0.03 K/uL 


 


RDW Standard Deviation  39.6 fL   42.7 fL 


 


RDW Coefficient of Variation  13.0 %   13.4 % 


 


Immature Granulocyte % (Auto)  0.5 %   0.4 % 


 


Immature Granulocyte # (Auto)  0.07 K/uL   0.04 K/uL 


 


Prothrombin Time  10.1 SECONDS   


 


Prothromb Time International


Ratio 


  0.9 


  


  


 


 


Activated Partial


Thromboplast Time 


  25.0 SECONDS 


  


  


 


 


Partial Thromboplastin Ratio  1.0   


 


Sodium Level  140 mmol/L  141 mmol/L  


 


Potassium Level  3.7 mmol/L  4.2 mmol/L  


 


Chloride Level  105 mmol/L  107 mmol/L  


 


Carbon Dioxide Level  23 mmol/L  26 mmol/L  


 


Anion Gap  12.0 mmol/L  8.0 mmol/L  


 


Blood Urea Nitrogen  18 mg/dl  16 mg/dl  


 


Creatinine  1.20 mg/dl  1.10 mg/dl  


 


Est Creatinine Clear Calc


Drug Dose 


  48.5 ml/min 


  52.9 ml/min 


  


 


 


Estimated GFR (


American) 


  55.3 


  61.4 


  


 


 


Estimated GFR (Non-


American 


  47.7 


  53.0 


  


 


 


BUN/Creatinine Ratio  15.4  14.8  


 


Random Glucose  194 mg/dl  141 mg/dl  


 


Calcium Level  8.9 mg/dl  8.7 mg/dl  


 


Magnesium Level   2.4 mg/dl  


 


Chemistry Specimen Hemolysis     











Assessment and Plan





(1) Closed right hip fracture


Assessment & Plan:  Post op care with dvt prop per orthopedics





(2) Depression


(3) Seizure disorder


Assessment & Plan:  continue lamictal

## 2017-04-18 NOTE — DIAGNOSTIC IMAGING REPORT
RIGHT HIP 2 VIEWS



CLINICAL HISTORY: Postoperative examination.



FINDINGS: AP and crosstable lateral portable views of the right hip are

correlated with radiographs of the right femur dated 4/17/2017. The skeletal

structures are osteopenic. A right hip arthroplasty is in near anatomic

alignment. A single cortical lag screw transfixes the acetabular cup. A buttress

plate has been placed along the lateral aspect of the proximal femur transfixing

a subtrochanteric fracture. The fragments are in near-anatomic alignment. 3

cerclage wires are present around the buttress plate and the arthroplasty stem.

There are expected postoperative findings around the right hip including skin

clips, a surgical drain, subcutaneous tissues gas, and soft tissue edema. The

imaged right hemipelvis appears intact.



IMPRESSION:



1. Expected postoperative findings status post right hip arthroplasty procedure.

The arthroplasty is in near-anatomic alignment.



2. Subtrochanteric fracture is identified with a buttress plate along the

lateral femoral cortex. The fragments are in near-anatomic alignment.







Electronically signed by:  Eduardo Estrella M.D.

4/18/2017 6:45 PM



Dictated Date/Time:  4/18/2017 6:42 PM

## 2017-04-18 NOTE — ORTHOPEDIC CONSULTATION
DATE OF CONSULTATION:  04/18/2017

 

CHIEF COMPLAINT:  Right hip pain.

 

HISTORY OF PRESENT ILLNESS:  This patient is a 66-year-old female who

sustained injury to her hip last night.  She apparently was running outside

after her dog on a gravel surface when she misstepped and fell suddenly.  She

had immediate pain and discomfort, was unable to stand or bear weight.  She

denied lightheadedness, dizziness or palpitations.  She has no other

complaints other than severe right hip pain.

 

PAST MEDICAL HISTORY:  Positive for severe depression.

 

PAST SURGICAL HISTORY:  History of multiple surgeries including hysterectomy

and C-sections.

 

MEDICATIONS:  Prozac 100 mg at bedtime, Lamictal 100 mg at bedtime.

 

ALLERGIES:  No known medical allergies.

 

FAMILY HISTORY:  The patient lives with her son.  She is retired.  No

significant family history.

 

REVIEW OF SYSTEMS:  Essentially negative.  See admission history and

physical.

 

PHYSICAL EXAMINATION:

ORTHOPEDIC:  The patient is lying in bed.  She is uncomfortable.  Her right

leg is markedly shortened and externally rotated.  She has good distal

pulses.  There are no openings in the skin.  Distal neurological function is

intact.  Remainder of orthopedic exam is negative.

 

IMAGING:  X-rays reveal a markedly comminuted proximal right hip fracture

which is intertrochanteric in nature but the greater trochanter is fractured

off, femoral neck is fractured off and this is obviously an unstable

fracture.

 

IMPRESSION:  Severe fracture, right hip.

 

PLAN:  Discussed with the patient that she has a complicated case with a high

neck and greater trochanter fracture.  Fixation options are multiple and

could be difficult including open reduction and internal fixation,

intramedullary pinning or calcar replacement type surgery.  This was

discussed with the patient, we will come up with a plan today.  Informed

consent is obtained.  She is n.p.o.

 

Thank you for this consult.

## 2017-04-18 NOTE — OPERATIVE REPORT
DATE OF OPERATION:  04/17/2017

 

PREOPERATIVE DIAGNOSIS:  Comminuted right proximal femur fracture.

 

POSTOPERATIVE DIAGNOSIS:  Same.

 

PROCEDURES:

1.  Right cemented total hip replacement with constrained acetabular

component.

2.  Repair of greater trochanter.

 

SURGEON:  Dr. Gomez.

 

ASSISTANT:  GUERA Miranda.

 

ANESTHESIA:  Spinal.

 

BLOOD LOSS:  300.

 

REPLACEMENT FLUIDS:  1800 mL of crystalloid.

 

DRAINS:  Hemovac x2.

 

CULTURES:  None.

 

COMPLICATIONS:  None.

 

COMPONENTS USED:

1.  Biomet G7 dual mobility cup size 48.

2.  Stem Chandrakant CRS size 15 x 180 with a 10-mm calcar augment and 3.5 x 28-mm

head.

3.  Trochanteric repair claw 4-hole Chandrakant plate.

 

NOTE:  GUERA Miranda was present and assisted throughout due to the

complicated nature of this case.  He helped with preparation and set up,

first assisted throughout and personally closed the fascial, subcutaneous and

skin layers and applied the postoperative dressing.

 

INDICATION FOR PROCEDURE:  This patient is a 66-year-old female who has a

history of depression, was in her usual state of health, was chasing her dog

and fell, sustaining a severely comminuted proximal femur fracture.  It was

decided that replacement would be more successful then an attempted pinning.

 

DESCRIPTION:  Following satisfactory spinal, the patient was placed in the

lateral decubitus position with the axillary roll and all sites were padded

appropriately and the body was secured with a lateral body positioner.

 

Following a surgical time-out, a lateral approach to the hip was performed,

deepened through a moderately large subcutaneous layer.  Hemostasis was

controlled.  The fascia was divided longitudinally exposing the proximal

femur.  It was obvious where the trochanteric fracture was.

 

The greater trochanter fracture was mobilized using the trochanteric slide

type approach leaving the vastus lateralis and all of the gluteus muscles

attached to the trochanter.  This was reflected anteriorly gluteus medius and

gluteus minimus.  The capsule was encountered.

 

A complete posterior capsulectomy was completed, exposing the femoral neck

and head fracture.  The femoral neck with the attached femoral head was

removed and the acetabulum was inspected.

 

Acetabular soft tissue content was excised.  Acetabular reaming was completed

and a 47 and a 48 shell was impacted into an anatomic position and secured

with a dome screw.  The dual mobility liner was placed.

 

Attention was turned to the femur.  The femur was prepared up to the size 15.

 A 10-mm calcar replacement device was used and a 28-mm head +3.5 showed good

soft tissue tension, leg lengths appeared approximately right using the

medial malleolus.

 

The trial component was removed.  The cement restriction plug was placed. 

Third generation cement technique was used using Simplex G cement.  When the

cement had hardened, a trial reduction with a +3 head showed similar

stability.

 

After irrigation, the final head and dual mobility head was placed.  The hip

was irrigated and reduced.  The wound was irrigated copiously.  Two Hemovac

drains were placed.

 

The greater trochanter was then reapproximated using a 4-hole plate to attach

it and this effected a very good fixation of the greater trochanter muscle

complex.  The wound was irrigated one final time.  The vastus lateralis was

reattached with a few posterior sutures.  After irrigation, the fascia was

closed with a running suture of 0 V-Loc and reinforced with #1 Vicryl. 

Subcutaneous tissues were closed with 1 and 2-0 Vicryl.  Skin was closed with

surgical staples.  A surface wound VAC was applied.  The patient was turned

back supine and returned to the recovery room having tolerated the procedure

in good condition.

 

 

I attest to the content of the Intraoperative Record and any orders documented therein. Any exceptio
ns are noted below.

## 2017-04-19 VITALS
SYSTOLIC BLOOD PRESSURE: 135 MMHG | OXYGEN SATURATION: 98 % | HEART RATE: 95 BPM | TEMPERATURE: 98.06 F | DIASTOLIC BLOOD PRESSURE: 75 MMHG

## 2017-04-19 VITALS — SYSTOLIC BLOOD PRESSURE: 111 MMHG | DIASTOLIC BLOOD PRESSURE: 65 MMHG | OXYGEN SATURATION: 91 % | HEART RATE: 97 BPM

## 2017-04-19 VITALS
DIASTOLIC BLOOD PRESSURE: 67 MMHG | HEART RATE: 87 BPM | TEMPERATURE: 98.24 F | OXYGEN SATURATION: 94 % | SYSTOLIC BLOOD PRESSURE: 114 MMHG

## 2017-04-19 VITALS
OXYGEN SATURATION: 97 % | HEART RATE: 98 BPM | SYSTOLIC BLOOD PRESSURE: 154 MMHG | DIASTOLIC BLOOD PRESSURE: 80 MMHG | TEMPERATURE: 98.24 F

## 2017-04-19 VITALS
HEART RATE: 99 BPM | OXYGEN SATURATION: 96 % | SYSTOLIC BLOOD PRESSURE: 166 MMHG | DIASTOLIC BLOOD PRESSURE: 80 MMHG | TEMPERATURE: 98.06 F

## 2017-04-19 VITALS — DIASTOLIC BLOOD PRESSURE: 79 MMHG | SYSTOLIC BLOOD PRESSURE: 178 MMHG

## 2017-04-19 LAB
ANION GAP SERPL CALC-SCNC: 9 MMOL/L (ref 3–11)
BASOPHILS # BLD: 0.03 K/UL (ref 0–0.2)
BASOPHILS NFR BLD: 0.2 %
BUN SERPL-MCNC: 10 MG/DL (ref 7–18)
BUN/CREAT SERPL: 11.2 (ref 10–20)
CALCIUM SERPL-MCNC: 8 MG/DL (ref 8.5–10.1)
CHLORIDE SERPL-SCNC: 105 MMOL/L (ref 98–107)
CO2 SERPL-SCNC: 25 MMOL/L (ref 21–32)
COMPLETE: YES
CREAT CL PREDICTED SERPL C-G-VRATE: 66.1 ML/MIN
CREAT SERPL-MCNC: 0.88 MG/DL (ref 0.6–1.2)
EOSINOPHIL NFR BLD AUTO: 210 K/UL (ref 130–400)
GLUCOSE SERPL-MCNC: 135 MG/DL (ref 70–99)
HCT VFR BLD CALC: 30.6 % (ref 37–47)
IG%: 0.3 %
IMM GRANULOCYTES NFR BLD AUTO: 8.3 %
LYMPHOCYTES # BLD: 1.24 K/UL (ref 1.2–3.4)
MAGNESIUM SERPL-MCNC: 1.9 MG/DL (ref 1.8–2.4)
MCH RBC QN AUTO: 29 PG (ref 25–34)
MCHC RBC AUTO-ENTMCNC: 33 G/DL (ref 32–36)
MCV RBC AUTO: 87.9 FL (ref 80–100)
MONOCYTES NFR BLD: 10.2 %
NEUTROPHILS # BLD AUTO: 0.1 %
NEUTROPHILS NFR BLD AUTO: 80.9 %
PMV BLD AUTO: 9.2 FL (ref 7.4–10.4)
POTASSIUM SERPL-SCNC: 4.1 MMOL/L (ref 3.5–5.1)
RBC # BLD AUTO: 3.48 M/UL (ref 4.2–5.4)
SODIUM SERPL-SCNC: 139 MMOL/L (ref 136–145)
WBC # BLD AUTO: 14.87 K/UL (ref 4.8–10.8)

## 2017-04-19 RX ADMIN — OXYCODONE HYDROCHLORIDE SCH MG: 10 TABLET, FILM COATED, EXTENDED RELEASE ORAL at 19:30

## 2017-04-19 RX ADMIN — OXYCODONE HYDROCHLORIDE PRN MG: 5 TABLET ORAL at 03:16

## 2017-04-19 RX ADMIN — PANTOPRAZOLE SODIUM SCH MLS/MIN: 40 INJECTION, POWDER, FOR SOLUTION INTRAVENOUS at 11:07

## 2017-04-19 RX ADMIN — SODIUM CHLORIDE PRN MG: 9 INJECTION INTRAMUSCULAR; INTRAVENOUS; SUBCUTANEOUS at 14:19

## 2017-04-19 RX ADMIN — HYDROMORPHONE HYDROCHLORIDE PRN MG: 1 INJECTION, SOLUTION INTRAMUSCULAR; INTRAVENOUS; SUBCUTANEOUS at 00:44

## 2017-04-19 RX ADMIN — HYDROMORPHONE HYDROCHLORIDE PRN MG: 1 INJECTION, SOLUTION INTRAMUSCULAR; INTRAVENOUS; SUBCUTANEOUS at 02:43

## 2017-04-19 RX ADMIN — SODIUM CHLORIDE AND POTASSIUM CHLORIDE SCH MLS/HR: 9; 1.49 INJECTION, SOLUTION INTRAVENOUS at 05:00

## 2017-04-19 RX ADMIN — HYDROMORPHONE HYDROCHLORIDE PRN MG: 1 INJECTION, SOLUTION INTRAMUSCULAR; INTRAVENOUS; SUBCUTANEOUS at 04:59

## 2017-04-19 RX ADMIN — CEFAZOLIN SCH MLS/HR: 10 INJECTION, POWDER, FOR SOLUTION INTRAVENOUS at 00:18

## 2017-04-19 RX ADMIN — SODIUM CHLORIDE AND POTASSIUM CHLORIDE SCH MLS/HR: 9; 1.49 INJECTION, SOLUTION INTRAVENOUS at 15:28

## 2017-04-19 RX ADMIN — OXYCODONE HYDROCHLORIDE PRN MG: 5 TABLET ORAL at 12:34

## 2017-04-19 RX ADMIN — CEFAZOLIN SCH MLS/HR: 10 INJECTION, POWDER, FOR SOLUTION INTRAVENOUS at 08:08

## 2017-04-19 RX ADMIN — ACETAMINOPHEN PRN MG: 325 TABLET ORAL at 00:23

## 2017-04-19 RX ADMIN — SODIUM CHLORIDE PRN MG: 9 INJECTION INTRAMUSCULAR; INTRAVENOUS; SUBCUTANEOUS at 07:44

## 2017-04-19 RX ADMIN — LORAZEPAM PRN MLS/MIN: 2 INJECTION INTRAMUSCULAR; INTRAVENOUS at 23:07

## 2017-04-19 RX ADMIN — OXYCODONE HYDROCHLORIDE SCH MG: 10 TABLET, FILM COATED, EXTENDED RELEASE ORAL at 07:44

## 2017-04-19 RX ADMIN — FLUOXETINE SCH MG: 20 CAPSULE ORAL at 20:53

## 2017-04-19 RX ADMIN — HYDROMORPHONE HYDROCHLORIDE PRN MG: 1 INJECTION, SOLUTION INTRAMUSCULAR; INTRAVENOUS; SUBCUTANEOUS at 05:47

## 2017-04-19 RX ADMIN — ENOXAPARIN SODIUM SCH MG: 40 INJECTION SUBCUTANEOUS at 12:33

## 2017-04-19 NOTE — ORTHOPEDIC PROGRESS NOTE
Orthopedic Progress Note


Date of Service


Apr 19, 2017.





Subjective


Post OP Day:  1


Reports: complaints (HIP PAIN, PAIN MEDS CHANGED AROUND THIS AM BY MCKENZIE.), 

Denies: SOB, calf pain, chest pain, light headedness, nausea / vomiting





Objective


calves soft nontender, N/V intact, hip located, dressing C/D/I, A&O x3, toes 

mobile, hemovac drainage (195/100CC PER SHIFT)











  Date Time  Temp Pulse Resp B/P Pulse Ox O2 Delivery O2 Flow Rate FiO2


 


4/19/17 07:23 36.8 98 16 154/80 97  2.0 


 


4/19/17 07:07      Nasal Cannula 2.0 


 


4/19/17 02:42 36.7 99 16 166/80 96 Nasal Cannula 3.0 


 


4/19/17 00:20    178/79    


 


4/18/17 23:45      Nasal Cannula 3.0 


 


4/18/17 23:25 36.6 96 16 185/93 97 Nasal Cannula 3.0 


 


4/18/17 20:43 36.6 98 17 177/92 95 Nasal Cannula 2.0 


 


4/18/17 19:49 36.9 102 17 183/108 96 Nasal Cannula 2.0 


 


4/18/17 18:43 36.4 96 16 175/95 98 Nasal Cannula 2.0 


 


4/18/17 18:23 36.6 94 16 164/84 95 Nasal Cannula 2.0 


 


4/18/17 17:40 36.4 90 18 121/74 95 Nasal Cannula 3.0 


 


4/18/17 17:40     95 Nasal Cannula 3.0 


 


4/18/17 17:40     95 Nasal Cannula 3.0 


 


4/18/17 17:35  97 16 143/74 97 Nasal Cannula 3 


 


4/18/17 17:20  98 18 133/73 96 Nasal Cannula 3 


 


4/18/17 17:10 36.4 97 20 119/71 96 Nasal Cannula 3 


 


4/18/17 17:00  97 18 122/65 97 Mask 10 


 


4/18/17 16:50  96 18 125/72 97 Mask 10 


 


4/18/17 16:43 36.8 95 16 110/64 98 Mask 10 








Laboratory Results 24 Hours:











Test


  4/19/17


05:48


 


White Blood Count 14.87 K/uL 


 


Red Blood Count 3.48 M/uL 


 


Hemoglobin 10.1 g/dL 


 


Hematocrit 30.6 % 


 


Mean Corpuscular Volume 87.9 fL 


 


Mean Corpuscular Hemoglobin 29.0 pg 


 


Mean Corpuscular Hemoglobin


Concent 33.0 g/dl 


 


 


Platelet Count 210 K/uL 


 


Mean Platelet Volume 9.2 fL 


 


Neutrophils (%) (Auto) 80.9 % 


 


Lymphocytes (%) (Auto) 8.3 % 


 


Monocytes (%) (Auto) 10.2 % 


 


Eosinophils (%) (Auto) 0.1 % 


 


Basophils (%) (Auto) 0.2 % 


 


Neutrophils # (Auto) 12.02 K/uL 


 


Lymphocytes # (Auto) 1.24 K/uL 


 


Monocytes # (Auto) 1.52 K/uL 


 


Eosinophils # (Auto) 0.01 K/uL 


 


Basophils # (Auto) 0.03 K/uL 











Assessment & Plan


Assessment:


POD#1 SP RIGHT KALEN FOR FRACTURE








Inhouse Planning


Pain Management:  Celebrex, Toradol, Oxycontin, PO Tylenol, Oxy IR


DVT Prophylaxis:  TEDs, SCDs, Lovenox (40MG ONCE DAILY)





Discharge Planning


Discharge Planning:  skilled nursing facility (ANTICIPATE SHORT INPT REHAB STAY.

)

## 2017-04-19 NOTE — HOSPITALIST PROGRESS NOTE
Hospitalist Progress Note


Date of Service


Apr 19, 2017.





Subjective


Pt evaluation today including:  conversation w/ patient, physical exam


Pain:  better controlled


patient has better pain control...has h.o insomnia ambien does not work for 

patient.  Rec Ativan with short acting pain med prior to sleep.





Medications











 Medications


  (Trade)  Dose


 Ordered  Sig/Saima


 Route  Start Time


 Stop Time Status Last Admin


Dose Admin


 


 Fluoxetine HCl


  (Prozac Cap)  100 mg  HS


 PO  4/18/17 21:00


 5/18/17 20:59  4/18/17 21:16


100 MG


 


 Lamotrigine


  (Lamictal Tab)  100 mg  HS


 PO  4/18/17 21:00


 5/18/17 20:59  4/18/17 21:16


100 MG


 


 Enoxaparin Sodium


 40 mg  40 mg  Q24H


 SQ  4/19/17 12:00


 5/19/17 11:59  4/19/17 12:33


40 MG


 


 Cefazolin Sodium/


 Dextrose


  (Ancef Iv/D5


 50ml)  60 ml @ 


 100 mls/hr  Q8H


 IV  4/19/17 00:30


 4/19/17 09:05 DC 4/19/17 08:08


100 MLS/HR


 


 Oxycodone HCl


  (Oxycontin Tab)  10 mg  Q12H


 PO  4/19/17 07:30


 5/3/17 07:29  4/19/17 07:44


10 MG


 


 Hydromorphone HCl


  (Dilaudid Inj)  0.5 mg  NOW  PRN


 IV  4/19/17 07:30


 4/19/17 11:30 DC 4/19/17 08:08


0.5 MG


 


 Ketorolac


 Tromethamine


  (Toradol Inj)  30 mg  Q6H  PRN


 IV  4/19/17 07:30


 4/24/17 07:29  4/19/17 14:19


30 MG











Objective


Vital Signs











  Date Time  Temp Pulse Resp B/P Pulse Ox O2 Delivery O2 Flow Rate FiO2


 


4/19/17 15:30      Nasal Cannula 2.0 


 


4/19/17 14:59 36.8 87 18 114/67 94 Nasal Cannula 2.0 


 


4/19/17 11:45  97   91   


 


4/19/17 07:23 36.8 98 16 154/80 97  2.0 


 


4/19/17 07:07      Nasal Cannula 2.0 


 


4/19/17 02:42 36.7 99 16 166/80 96 Nasal Cannula 3.0 


 


4/19/17 00:20    178/79    


 


4/18/17 23:45      Nasal Cannula 3.0 


 


4/18/17 23:25 36.6 96 16 185/93 97 Nasal Cannula 3.0 


 


4/18/17 20:43 36.6 98 17 177/92 95 Nasal Cannula 2.0 


 


4/18/17 19:49 36.9 102 17 183/108 96 Nasal Cannula 2.0 











Physical Exam


General Appearance:  WD/WN, no apparent distress


Eyes:  sclerae normal


Neck:  trachea midline


Respiratory/Chest:  lungs clear


Cardiovascular:  regular rate, rhythm


Abdomen:  normal bowel sounds





Laboratory Results





Last 24 Hours








Test


  4/19/17


05:48


 


White Blood Count 14.87 K/uL 


 


Red Blood Count 3.48 M/uL 


 


Hemoglobin 10.1 g/dL 


 


Hematocrit 30.6 % 


 


Mean Corpuscular Volume 87.9 fL 


 


Mean Corpuscular Hemoglobin 29.0 pg 


 


Mean Corpuscular Hemoglobin


Concent 33.0 g/dl 


 


 


Platelet Count 210 K/uL 


 


Mean Platelet Volume 9.2 fL 


 


Neutrophils (%) (Auto) 80.9 % 


 


Lymphocytes (%) (Auto) 8.3 % 


 


Monocytes (%) (Auto) 10.2 % 


 


Eosinophils (%) (Auto) 0.1 % 


 


Basophils (%) (Auto) 0.2 % 


 


Neutrophils # (Auto) 12.02 K/uL 


 


Lymphocytes # (Auto) 1.24 K/uL 


 


Monocytes # (Auto) 1.52 K/uL 


 


Eosinophils # (Auto) 0.01 K/uL 


 


Basophils # (Auto) 0.03 K/uL 


 


RDW Standard Deviation 42.6 fL 


 


RDW Coefficient of Variation 13.2 % 


 


Immature Granulocyte % (Auto) 0.3 % 


 


Immature Granulocyte # (Auto) 0.05 K/uL 


 


Sodium Level 139 mmol/L 


 


Potassium Level 4.1 mmol/L 


 


Chloride Level 105 mmol/L 


 


Carbon Dioxide Level 25 mmol/L 


 


Anion Gap 9.0 mmol/L 


 


Blood Urea Nitrogen 10 mg/dl 


 


Creatinine 0.88 mg/dl 


 


Est Creatinine Clear Calc


Drug Dose 66.1 ml/min 


 


 


Estimated GFR (


American) 80.5 


 


 


Estimated GFR (Non-


American 69.4 


 


 


BUN/Creatinine Ratio 11.2 


 


Random Glucose 135 mg/dl 


 


Calcium Level 8.0 mg/dl 


 


Magnesium Level 1.9 mg/dl 











Assessment and Plan





(1) Closed right hip fracture


Assessment & Plan:  s/p repair post operative care per orthopedics





(2) Depression


Assessment & Plan:  continue prozac and lamictal





(3) Advance care planning


Assessment & Plan:  discussed patient has a living will and poa

## 2017-04-20 VITALS
HEART RATE: 86 BPM | SYSTOLIC BLOOD PRESSURE: 121 MMHG | DIASTOLIC BLOOD PRESSURE: 71 MMHG | TEMPERATURE: 98.06 F | OXYGEN SATURATION: 98 %

## 2017-04-20 VITALS
HEART RATE: 98 BPM | SYSTOLIC BLOOD PRESSURE: 149 MMHG | OXYGEN SATURATION: 97 % | TEMPERATURE: 98.6 F | DIASTOLIC BLOOD PRESSURE: 78 MMHG

## 2017-04-20 VITALS
DIASTOLIC BLOOD PRESSURE: 72 MMHG | SYSTOLIC BLOOD PRESSURE: 145 MMHG | TEMPERATURE: 98.06 F | OXYGEN SATURATION: 100 % | HEART RATE: 89 BPM

## 2017-04-20 LAB
ANION GAP SERPL CALC-SCNC: 8 MMOL/L (ref 3–11)
BASOPHILS # BLD: 0.03 K/UL (ref 0–0.2)
BASOPHILS NFR BLD: 0.3 %
BUN SERPL-MCNC: 12 MG/DL (ref 7–18)
BUN/CREAT SERPL: 13.6 (ref 10–20)
CALCIUM SERPL-MCNC: 8 MG/DL (ref 8.5–10.1)
CHLORIDE SERPL-SCNC: 108 MMOL/L (ref 98–107)
CO2 SERPL-SCNC: 25 MMOL/L (ref 21–32)
COMPLETE: YES
CREAT CL PREDICTED SERPL C-G-VRATE: 66.9 ML/MIN
CREAT SERPL-MCNC: 0.87 MG/DL (ref 0.6–1.2)
EOSINOPHIL NFR BLD AUTO: 157 K/UL (ref 130–400)
GLUCOSE SERPL-MCNC: 80 MG/DL (ref 70–99)
HCT VFR BLD CALC: 27 % (ref 37–47)
IG%: 0.5 %
IMM GRANULOCYTES NFR BLD AUTO: 15.6 %
LYMPHOCYTES # BLD: 1.57 K/UL (ref 1.2–3.4)
MAGNESIUM SERPL-MCNC: 2.1 MG/DL (ref 1.8–2.4)
MCH RBC QN AUTO: 28.7 PG (ref 25–34)
MCHC RBC AUTO-ENTMCNC: 32.2 G/DL (ref 32–36)
MCV RBC AUTO: 89.1 FL (ref 80–100)
MONOCYTES NFR BLD: 10.2 %
NEUTROPHILS # BLD AUTO: 2.1 %
NEUTROPHILS NFR BLD AUTO: 71.3 %
PMV BLD AUTO: 9 FL (ref 7.4–10.4)
POTASSIUM SERPL-SCNC: 3.9 MMOL/L (ref 3.5–5.1)
RBC # BLD AUTO: 3.03 M/UL (ref 4.2–5.4)
SODIUM SERPL-SCNC: 141 MMOL/L (ref 136–145)
WBC # BLD AUTO: 10.08 K/UL (ref 4.8–10.8)

## 2017-04-20 RX ADMIN — Medication SCH GM: at 19:00

## 2017-04-20 RX ADMIN — PANTOPRAZOLE SCH MG: 40 TABLET, DELAYED RELEASE ORAL at 08:47

## 2017-04-20 RX ADMIN — ACETAMINOPHEN PRN MG: 325 TABLET ORAL at 08:49

## 2017-04-20 RX ADMIN — ENOXAPARIN SODIUM SCH MG: 40 INJECTION SUBCUTANEOUS at 11:50

## 2017-04-20 RX ADMIN — OXYCODONE HYDROCHLORIDE PRN MG: 5 TABLET ORAL at 15:45

## 2017-04-20 RX ADMIN — SODIUM CHLORIDE PRN MG: 9 INJECTION INTRAMUSCULAR; INTRAVENOUS; SUBCUTANEOUS at 15:45

## 2017-04-20 RX ADMIN — OXYCODONE HYDROCHLORIDE SCH MG: 10 TABLET, FILM COATED, EXTENDED RELEASE ORAL at 07:05

## 2017-04-20 RX ADMIN — SODIUM CHLORIDE PRN MG: 9 INJECTION INTRAMUSCULAR; INTRAVENOUS; SUBCUTANEOUS at 01:41

## 2017-04-20 RX ADMIN — Medication SCH GM: at 06:37

## 2017-04-20 RX ADMIN — SODIUM CHLORIDE AND POTASSIUM CHLORIDE SCH MLS/HR: 9; 1.49 INJECTION, SOLUTION INTRAVENOUS at 01:39

## 2017-04-20 RX ADMIN — Medication SCH GM: at 11:49

## 2017-04-20 RX ADMIN — LORAZEPAM PRN MLS/MIN: 2 INJECTION INTRAMUSCULAR; INTRAVENOUS at 16:36

## 2017-04-20 RX ADMIN — SODIUM CHLORIDE AND POTASSIUM CHLORIDE SCH MLS/HR: 9; 1.49 INJECTION, SOLUTION INTRAVENOUS at 11:04

## 2017-04-20 RX ADMIN — OXYCODONE HYDROCHLORIDE SCH MG: 10 TABLET, FILM COATED, EXTENDED RELEASE ORAL at 19:03

## 2017-04-20 RX ADMIN — FLUOXETINE SCH MG: 20 CAPSULE ORAL at 21:13

## 2017-04-20 NOTE — ORTHOPEDIC PROGRESS NOTE
Orthopedic Progress Note


Date of Service


Apr 20, 2017.





Subjective


Post OP Day:  2


Reports: feeling well, Denies: SOB, calf pain, chest pain, light headedness, 

nausea / vomiting


Additional Notes:


PAIN CONTROL SEEMS IMPROVED TODAY.





Objective


calves soft nontender, N/V intact, dressing C/D/I, A&O x3, toes mobile











  Date Time  Temp Pulse Resp B/P Pulse Ox O2 Delivery O2 Flow Rate FiO2


 


4/20/17 07:05 36.7 89 18 145/72 100  2.0 


 


4/19/17 23:30      Nasal Cannula 2.0 


 


4/19/17 22:55 36.7 95 18 135/75 98 Nasal Cannula 2.0 


 


4/19/17 15:30      Nasal Cannula 2.0 


 


4/19/17 14:59 36.8 87 18 114/67 94 Nasal Cannula 2.0 


 


4/19/17 11:45  97   91   








Laboratory Results 24 Hours:











Test


  4/20/17


05:53


 


White Blood Count 10.08 K/uL 


 


Red Blood Count 3.03 M/uL 


 


Hemoglobin 8.7 g/dL 


 


Hematocrit 27.0 % 


 


Mean Corpuscular Volume 89.1 fL 


 


Mean Corpuscular Hemoglobin 28.7 pg 


 


Mean Corpuscular Hemoglobin


Concent 32.2 g/dl 


 


 


Platelet Count 157 K/uL 


 


Mean Platelet Volume 9.0 fL 


 


Neutrophils (%) (Auto) 71.3 % 


 


Lymphocytes (%) (Auto) 15.6 % 


 


Monocytes (%) (Auto) 10.2 % 


 


Eosinophils (%) (Auto) 2.1 % 


 


Basophils (%) (Auto) 0.3 % 


 


Neutrophils # (Auto) 7.19 K/uL 


 


Lymphocytes # (Auto) 1.57 K/uL 


 


Monocytes # (Auto) 1.03 K/uL 


 


Eosinophils # (Auto) 0.21 K/uL 


 


Basophils # (Auto) 0.03 K/uL 











Assessment & Plan


Assessment:


POD#2 SP RIGHT KALEN FOR FRACTURE








Inhouse Planning


Pain Management:  Celebrex, Toradol, Oxycontin, PO Tylenol, Oxy IR


DVT Prophylaxis:  TEDs, SCDs, Lovenox (40MG ONCE DAILY)





Discharge Planning


Discharge Planning:  skilled nursing facility (ANTICIPATE SHORT INPT REHAB STAY.

)

## 2017-04-20 NOTE — HOSPITALIST PROGRESS NOTE
Hospitalist Progress Note


Date of Service


Apr 20, 2017.





Subjective


Pt evaluation today including:  conversation w/ patient


patient with no complaints





   All Other Systems:  Reviewed and Negative





Objective


Vital Signs











  Date Time  Temp Pulse Resp B/P Pulse Ox O2 Delivery O2 Flow Rate FiO2


 


4/20/17 14:58 36.7 86 18 121/71 98 Room Air  


 


4/20/17 07:05      Room Air  


 


4/20/17 07:05 36.7 89 18 145/72 100  2.0 


 


4/19/17 23:30      Nasal Cannula 2.0 


 


4/19/17 22:55 36.7 95 18 135/75 98 Nasal Cannula 2.0 











Physical Exam


General Appearance:  no apparent distress


Respiratory/Chest:  lungs clear


Cardiovascular:  regular rate, rhythm


Abdomen:  normal bowel sounds


Extremities:  normal range of motion


Skin:  normal color





Laboratory Results





Last 24 Hours








Test


  4/20/17


05:53


 


White Blood Count 10.08 K/uL 


 


Red Blood Count 3.03 M/uL 


 


Hemoglobin 8.7 g/dL 


 


Hematocrit 27.0 % 


 


Mean Corpuscular Volume 89.1 fL 


 


Mean Corpuscular Hemoglobin 28.7 pg 


 


Mean Corpuscular Hemoglobin


Concent 32.2 g/dl 


 


 


Platelet Count 157 K/uL 


 


Mean Platelet Volume 9.0 fL 


 


Neutrophils (%) (Auto) 71.3 % 


 


Lymphocytes (%) (Auto) 15.6 % 


 


Monocytes (%) (Auto) 10.2 % 


 


Eosinophils (%) (Auto) 2.1 % 


 


Basophils (%) (Auto) 0.3 % 


 


Neutrophils # (Auto) 7.19 K/uL 


 


Lymphocytes # (Auto) 1.57 K/uL 


 


Monocytes # (Auto) 1.03 K/uL 


 


Eosinophils # (Auto) 0.21 K/uL 


 


Basophils # (Auto) 0.03 K/uL 


 


RDW Standard Deviation 43.1 fL 


 


RDW Coefficient of Variation 13.2 % 


 


Immature Granulocyte % (Auto) 0.5 % 


 


Immature Granulocyte # (Auto) 0.05 K/uL 


 


Red Blood Cell Morphology Unremarkable 


 


Sodium Level 141 mmol/L 


 


Potassium Level 3.9 mmol/L 


 


Chloride Level 108 mmol/L 


 


Carbon Dioxide Level 25 mmol/L 


 


Anion Gap 8.0 mmol/L 


 


Blood Urea Nitrogen 12 mg/dl 


 


Creatinine 0.87 mg/dl 


 


Est Creatinine Clear Calc


Drug Dose 66.9 ml/min 


 


 


Estimated GFR (


American) 81.6 


 


 


Estimated GFR (Non-


American 70.4 


 


 


BUN/Creatinine Ratio 13.6 


 


Random Glucose 80 mg/dl 


 


Calcium Level 8.0 mg/dl 


 


Magnesium Level 2.1 mg/dl 











Assessment and Plan





(1) Closed right hip fracture


Assessment & Plan:  S/p right total hip replacement doing well





(2) Depression


Assessment & Plan:  continue prozac





(3) Advance care planning

## 2017-04-21 VITALS
TEMPERATURE: 98.06 F | HEART RATE: 91 BPM | DIASTOLIC BLOOD PRESSURE: 84 MMHG | SYSTOLIC BLOOD PRESSURE: 158 MMHG | OXYGEN SATURATION: 93 %

## 2017-04-21 VITALS
SYSTOLIC BLOOD PRESSURE: 158 MMHG | DIASTOLIC BLOOD PRESSURE: 84 MMHG | TEMPERATURE: 98.06 F | OXYGEN SATURATION: 93 % | HEART RATE: 91 BPM

## 2017-04-21 LAB
BASOPHILS # BLD: 0.03 K/UL (ref 0–0.2)
BASOPHILS NFR BLD: 0.3 %
COMPLETE: YES
EOSINOPHIL NFR BLD AUTO: 217 K/UL (ref 130–400)
HCT VFR BLD CALC: 26.6 % (ref 37–47)
IG%: 0.6 %
IMM GRANULOCYTES NFR BLD AUTO: 11.3 %
LYMPHOCYTES # BLD: 1.21 K/UL (ref 1.2–3.4)
MCH RBC QN AUTO: 28.1 PG (ref 25–34)
MCHC RBC AUTO-ENTMCNC: 32.3 G/DL (ref 32–36)
MCV RBC AUTO: 86.9 FL (ref 80–100)
MONOCYTES NFR BLD: 8.9 %
NEUTROPHILS # BLD AUTO: 3.7 %
NEUTROPHILS NFR BLD AUTO: 75.2 %
PMV BLD AUTO: 8.7 FL (ref 7.4–10.4)
RBC # BLD AUTO: 3.06 M/UL (ref 4.2–5.4)
WBC # BLD AUTO: 10.7 K/UL (ref 4.8–10.8)

## 2017-04-21 RX ADMIN — Medication SCH GM: at 06:21

## 2017-04-21 RX ADMIN — SODIUM CHLORIDE PRN MG: 9 INJECTION INTRAMUSCULAR; INTRAVENOUS; SUBCUTANEOUS at 11:52

## 2017-04-21 RX ADMIN — ENOXAPARIN SODIUM SCH MG: 40 INJECTION SUBCUTANEOUS at 12:10

## 2017-04-21 RX ADMIN — PANTOPRAZOLE SCH MG: 40 TABLET, DELAYED RELEASE ORAL at 07:45

## 2017-04-21 RX ADMIN — Medication SCH GM: at 00:45

## 2017-04-21 RX ADMIN — OXYCODONE HYDROCHLORIDE PRN MG: 5 TABLET ORAL at 10:31

## 2017-04-21 RX ADMIN — Medication SCH GM: at 12:11

## 2017-04-21 RX ADMIN — OXYCODONE HYDROCHLORIDE SCH MG: 10 TABLET, FILM COATED, EXTENDED RELEASE ORAL at 07:45

## 2017-04-21 NOTE — ORTHOPEDIC PROGRESS NOTE
Orthopedic Progress Note


Date of Service


Apr 21, 2017.





Subjective


Post OP Day:  3


Reports: feeling well, Denies: SOB, calf pain, chest pain, light headedness, 

nausea / vomiting





Objective


calves soft nontender, N/V intact, dressing C/D/I, A&O x3, toes mobile











  Date Time  Temp Pulse Resp B/P Pulse Ox O2 Delivery O2 Flow Rate FiO2


 


4/21/17 00:45      Nasal Cannula 2.0 


 


4/20/17 23:31 37.0 98 17 149/78 97 Nasal Cannula 2.0 


 


4/20/17 15:30      Room Air  


 


4/20/17 14:58 36.7 86 18 121/71 98 Room Air  








Laboratory Results 24 Hours:











Test


  4/21/17


07:28











Assessment & Plan


Assessment:


POD#2 SP RIGHT KALEN FOR FRACTURE


Plan:


DC PREVENA DRESSING 1 WEEK POST OP


WBAT WITH WALKER


CONT MIRNA HOSE X 2 WEEKS


LOVENOX X 14 DAYS THEN ASA 81BID X 2 WEEKS.


FOLLOW UP WITH DR. CHOUDHARY IN 2 WEEKS FOR WOUND CHECK AND XRAY.





ORTHO INSTRUCTIONS ON THE CHART.








Inhouse Planning


Pain Management:  Celebrex, Toradol, Oxycontin, PO Tylenol, Oxy IR


DVT Prophylaxis:  TEDs, SCDs, Lovenox (40MG ONCE DAILY)





Discharge Planning


Discharge Planning:  skilled nursing facility (ANTICIPATE SHORT INPT REHAB STAY.

)

## 2017-04-21 NOTE — DISCHARGE INSTRUCTIONS
Discharge Instructions


Date of Service


Apr 21, 2017.





Admission


Reason for Admission:  Closed Right Hip Fracture





Discharge


Discharge Diagnosis / Problem:  right hip fracture





Discharge Goals


Goal(s):  Improve function, Increase independence





Activity Recommendations


Activity Limitations:  per Instructions/Follow-up section





.





Instructions / Follow-Up


Instructions / Follow-Up


follow up with Primary care Physician 1 week after discharge


Follow up with orthopedics as directed





Current Hospital Diet


Patient's current hospital diet: Regular Diet





Discharge Diet


Recommended Diet:  Regular Diet





Procedures


Procedures Performed:  


Right Cemented Hip Replacement Constrained Acetabular Cup, Greater Trochater 


Repair





Pending Studies


Studies pending at discharge:  no





Medical Emergencies








.


Who to Call and When:





Medical Emergencies:  If at any time you feel your situation is an emergency, 

please call 911 immediately.





.





Non-Emergent Contact


Non-Emergency issues call your:  Primary Care Provider





.


.





"Provider Documentation" section prepared by Cole Rodriguez.








.





Consultant Recommendations


Consultant Recommendations:


ACTIVITY RECOMMENDATIONS:





SELF CARE INSTRUCTIONS AFTER TOTAL HIP REPLACEMENT





Until the incision and soft tissues around your hip have healed, there is


a possibility that the hip prosthesis could dislocate.





A.  Observe the following precautions to prevent dislocation:


   1.  Don't bend your hip greater than 90 degrees.


   2.  Avoid crossing your legs or ankles while standing or lying.


   3.  Sit with your feet placed 6 inches apart.


   4.  When sitting, keep your knees below your hips.  Sit on a firm 


        surface, avoid deep, soft chairs and couches.  Use an elevated


        toilet seat in the bathroom.


   5.  Don't bend over at the waist.  Use a long handled shoehorn and


        a sock aid to help you put on your shoes and socks.  A reacher


        can help you  objects that are too high or too low to reach.


   6.  Keep car riding to a minimum for at least one month after surgery.





B.  Your balance may be shaky for a while.  Use crutches or a walker until 


     directed by your doctor.





C.  Use hand rails when walking on stairs.





D.  Wear low heeled shoes with non-slip soles.





E.  Be sure that your floors are free of things that could trip you - throw rugs

,


     electrical cords, small objects.  Avoid wet and waxed floors, especially 

with


     crutches and canes.





F.  Try to walk several times a day with rest periods between.





G.  Continue with all the exercises taught to you in the hospital.  Again, make 


     walking a part of your daily routine.








SPECIAL CARE INSTRUCTIONS:





**VERY IMPORTANT TO READ AND REVIEW**





A.  You may still be at risk for phlebitis and blood clots.





   1.  Wear surgical stockings (MIRNA hose) for 2 weeks after surgery to improve


        circulation and reduce swelling.


   3.  High risk patients may be prescribed a stronger blood thinner if 

necessary.  -LOVENOX 40mg daily x 2 weeks then ASA 81mg BID x 2 weeks.


   4.  If you are on Coumadin normally, your family doctor/cardiologist should 

monitor 


                 your blood work.  Expect a phone call the day of or the day 

after bloodwork is


                 drawn to adjust your dosage.





B.  You must take antibiotics before having dental work, bladder, bowel and 

other


     surgery.  Your doctor will provide you with a permanent card to carry 

describing 


     precautions.  





C.  Call [facility] if you have a fever, redness or swelling 

around


     the incision, cloudy drainage from incision, or sudden increase in pain in 

your hip, not


     relieved by your regular pain medication.  





D.  Please call the office at [phone] if you have any concerns or 

questions about


     your operation or recovery.





*  YOU MAY SHOWER, NO TUB BATHS UNTIL CLEARED BY YOUR DOCTOR.





*  WEAR MIRNA HOSE 20 HOURS PER DAY FOR 2 WEEKS.





*  YOU SHOULD USE A WALKER OR CRUTCHES FOR 2-4 WEEKS.  THIS WILL HELP PREVENT


    STRAIN ON YOUR HIP MUSCLE AND ALLOW IT TO HEAL PROPERLY. YOU MAY WEAN TO A


    CANE AS TOLERATED.





*  MOST PATIENTS WILL HAVE HOME NURSING FOR THERAPY.  IF YOU DECIDE TO DO 


   OUTPATIENT PHYSICAL THERAPY, PLEASE SCHEDULE THIS 3 TIMES PER WEEK.





Prevena-  This is a large suction dressing  covering your incision.  This will 

help pull any excess drainage from the wound and allow your incision to heal 

properly.  You may shower with this if you can keep the unit outside of the 

shower.  If any bleeding or leakage is noted please call your doctor's office.  

This will remain on your incision for 7 days and then should be removed.  This 

can be done yourself or by the home nursing staff if applicable.  The entire 

unit is disposable once removed.  Once removed, keep incision clean and dry.  

If redness or drainage is noted, please call your surgeon. 


   





FOLLOW UP VISIT:





If appointment is not already scheduled:





Please call [facility] to make a follow-up appointment for 


2 weeks after your surgery at [phone].





VTE Core Measure


Inpt VTE Proph given/why not?:  SCD's

## 2017-04-25 NOTE — DISCHARGE SUMMARY
Please see dictated H and P for full details.

 

A 66-year-old who came in complaining of severe right hip pain which started

right after she was jogging and fell.  She had no other complaints other than

right hip pain post falling.  Workup in the Emergency Room revealed an

anterior trochanteric right hip fracture.  Fracture is comminuted and mildly

displaced.  She was admitted and Dr. Gomez was consulted for orthopedics. 

On 04/18/2017, she underwent a right cemented hip replacement constrained

acetabular cup and a greater trochanteric repair.  Estimated blood loss was

300 mL.  She had an unremarkable postoperative period and was deemed to be

stable for discharge on 04/21/2017.  She was accepted at [facility] and

transferred there in stable condition.

 

Time spent in review of the chart, discussion with the patient on the date of

discharge 31 minutes.

 

 

 

LITO

## 2017-10-03 ENCOUNTER — HOSPITAL ENCOUNTER (OUTPATIENT)
Dept: HOSPITAL 45 - C.MAMM | Age: 67
Discharge: HOME | End: 2017-10-03
Attending: NURSE PRACTITIONER
Payer: COMMERCIAL

## 2017-10-03 DIAGNOSIS — Z12.31: Primary | ICD-10-CM

## 2017-10-03 NOTE — MAMMOGRAPHY REPORT
BILATERAL DIGITAL SCREENING MAMMOGRAM WITH CAD: 10/3/2017

CLINICAL HISTORY: Routine screening.  Patient has no complaints.  





TECHNIQUE: Bilateral CC and MLO views were obtained.  Current study was also evaluated with a Compute
r Aided Detection (CAD) system.  



COMPARISON: Comparison is made to exams dated:  9/30/2016 mammogram, 9/24/2015 mammogram, 7/25/2014 m
ammogram, 7/16/2014 mammogram, 7/8/2014 mammogram - [facility], and 4/4/2013 mammog
christie.   



BREAST COMPOSITION:  The tissue of both breasts is heterogeneously dense, which may obscure small mas
ses.  



FINDINGS:  There is a stable ribbon-shaped biopsy marker clip adjacent to a previously biopsied parti
ally circumscribed mass in the 9:00 right breast anteriorly.  Stable grouped round and punctate micro
calcifications in the left upper outer quadrant, that are unchanged dating back to at least 2008. No 
new suspicious mass, architectural distortion or cluster of microcalcifications is seen.  



IMPRESSION:  ACR BI-RADS CATEGORY 2: BENIGN

There is no mammographic evidence of malignancy. A 1 year screening mammogram is recommended.  The pa
tient will receive written notification of the results.  





Approximately 10% of breast cancers are not detected with mammography. A negative mammographic report
 should not delay biopsy if a clinically suggestive mass is present.



Ema Hernandez M.D.          

ay/:10/3/2017 15:21:07  



Imaging Technologist: Rachael WERNER(FLEX)(CARLY)(BD), [facility]

letter sent: Normal 1/2  

BI-RADS Code: ACR BI-RADS Category 2: Benign

## 2018-01-08 ENCOUNTER — HOSPITAL ENCOUNTER (EMERGENCY)
Dept: HOSPITAL 45 - C.EDB | Age: 68
Discharge: HOME | End: 2018-01-08
Payer: COMMERCIAL

## 2018-01-08 VITALS — TEMPERATURE: 98.42 F

## 2018-01-08 VITALS
WEIGHT: 171.52 LBS | BODY MASS INDEX: 28.58 KG/M2 | HEIGHT: 65 IN | BODY MASS INDEX: 28.58 KG/M2 | HEIGHT: 65 IN | WEIGHT: 171.52 LBS

## 2018-01-08 VITALS — SYSTOLIC BLOOD PRESSURE: 160 MMHG | DIASTOLIC BLOOD PRESSURE: 65 MMHG | HEART RATE: 84 BPM | OXYGEN SATURATION: 96 %

## 2018-01-08 DIAGNOSIS — F32.9: ICD-10-CM

## 2018-01-08 DIAGNOSIS — R10.13: ICD-10-CM

## 2018-01-08 DIAGNOSIS — G43.909: ICD-10-CM

## 2018-01-08 DIAGNOSIS — Z79.899: ICD-10-CM

## 2018-01-08 DIAGNOSIS — K29.01: ICD-10-CM

## 2018-01-08 DIAGNOSIS — R11.2: Primary | ICD-10-CM

## 2018-01-08 LAB
ALBUMIN SERPL-MCNC: 3.4 GM/DL (ref 3.4–5)
ALP SERPL-CCNC: 82 U/L (ref 45–117)
ALT SERPL-CCNC: 326 U/L (ref 12–78)
AST SERPL-CCNC: 183 U/L (ref 15–37)
BASOPHILS # BLD: 0.01 K/UL (ref 0–0.2)
BASOPHILS NFR BLD: 0.1 %
BUN SERPL-MCNC: 23 MG/DL (ref 7–18)
CALCIUM SERPL-MCNC: 9.2 MG/DL (ref 8.5–10.1)
CO2 SERPL-SCNC: 24 MMOL/L (ref 21–32)
CREAT SERPL-MCNC: 1.21 MG/DL (ref 0.6–1.2)
EOS ABS #: 0 K/UL (ref 0–0.5)
EOSINOPHIL NFR BLD AUTO: 262 K/UL (ref 130–400)
GLUCOSE SERPL-MCNC: 124 MG/DL (ref 70–99)
HCT VFR BLD CALC: 45 % (ref 37–47)
HGB BLD-MCNC: 15.2 G/DL (ref 12–16)
IG#: 0.02 K/UL (ref 0–0.02)
IMM GRANULOCYTES NFR BLD AUTO: 17.2 %
INR PPP: 0.9 (ref 0.9–1.1)
LIPASE: 82 U/L (ref 73–393)
LYMPHOCYTES # BLD: 1.51 K/UL (ref 1.2–3.4)
MCH RBC QN AUTO: 28.7 PG (ref 25–34)
MCHC RBC AUTO-ENTMCNC: 33.8 G/DL (ref 32–36)
MCV RBC AUTO: 85.1 FL (ref 80–100)
MONO ABS #: 1.15 K/UL (ref 0.11–0.59)
MONOCYTES NFR BLD: 13.1 %
NEUT ABS #: 6.08 K/UL (ref 1.4–6.5)
NEUTROPHILS # BLD AUTO: 0 %
NEUTROPHILS NFR BLD AUTO: 69.4 %
PMV BLD AUTO: 9.6 FL (ref 7.4–10.4)
POTASSIUM SERPL-SCNC: 3.4 MMOL/L (ref 3.5–5.1)
PROT SERPL-MCNC: 7.6 GM/DL (ref 6.4–8.2)
RED CELL DISTRIBUTION WIDTH CV: 13.1 % (ref 11.5–14.5)
RED CELL DISTRIBUTION WIDTH SD: 40.8 FL (ref 36.4–46.3)
SODIUM SERPL-SCNC: 135 MMOL/L (ref 136–145)
WBC # BLD AUTO: 8.77 K/UL (ref 4.8–10.8)

## 2018-01-08 NOTE — EMERGENCY ROOM VISIT NOTE
ED Visit Note


First contact with patient:  11:32


I received this patient in signout at the change of shift from Dr. Lr.  

Patient was awaiting IV hydration and improvement in her nausea.  The patient 

felt well and upon standing to get dressed to be discharged, had an acute 

exacerbation of her nausea.  She was ordered 4 mg of IV Zofran and observed.  

The patient did not have significant improvement and requested something 

different.  She was given IV Phenergan 12.5 mg.  She denied any further 

vomiting.  She did receive a second 500 mL bolus of IV normal saline solution.  

The patient had near complete resolution of her symptoms.  The patient was 

given a prescription for Phenergan 25 mg tablets every 6 hours as needed for 

nausea.  She will follow-up with her PCP and follow discharge instructions as 

given by Dr. Lr.  She will begin Protonix as prescribed by Dr. Lr 

earlier.  Patient was advised to return to the ER for worsening of symptoms or 

any medical concerns.

## 2018-01-08 NOTE — DIAGNOSTIC IMAGING REPORT
CHEST AND ABDOMEN 2 VIEWS



HISTORY: epigastric pain, vomiting   



COMPARISON:  Chest 4/17/2017.



FINDINGS: The lungs are clear. The heart is normal in size. No pleural

effusions. No pneumothorax. There are apical predominant emphysematous changes.

Mild dextroscoliosis of the lumbar spine. Cholecystectomy. No pneumoperitoneum.

No pneumatosis. The bowel gas pattern is unremarkable. No evidence for bowel

obstruction. Right total hip arthroplasty. No renal or ureteral calculi

identified.



IMPRESSION:  

No acute cardiopulmonary process. No evidence for bowel obstruction. 









Electronically signed by:  Duncan Malloy M.D.

1/8/2018 7:19 AM



Dictated Date/Time:  1/8/2018 7:17 AM

## 2018-01-08 NOTE — EMERGENCY ROOM VISIT NOTE
History


Report prepared by Navneet:  Dennis White


Under the Supervision of:  Dr. Hoa Lr D.O.


First contact with patient:  05:22


Chief Complaint:  VOMITING


Stated Complaint:  VOMITING FOR 2 DAYS





History of Present Illness


The patient is a 67 year old female who presents to the Emergency Room with 

complaints of persistent nausea and vomiting that began on Saturday of last week

, two days prior to arrival. The patient states that she developed a cough on 

Thursday of last week, and went to [facility]. At [facility] she was given a 

prescription for Prednisone. She took the Prednisone for the first time on 

Saturday, the same day the vomiting began. The patient also complains of 

abdominal pain and weakness, but denies any dizziness. She claims that she has 

had stomach issues since she was very young. Her most recent bowel movement was 

Saturday, and was loose and dark in color. She is not currently taking any 

blood thinners.  States was using Alleve for her discomfort previously but hasn'

t had any since starting the steroids.  No ASA at home.





   Source of History:  patient


   Onset:  Two days PTA


   Position:  other (GI)


   Quality:  other (Vomiting)


   Timing:  other (persistent)


   Associated Symptoms:  + abdominal pain, + weakness





Review of Systems


See HPI for pertinent positives & negatives. A total of 10 systems reviewed and 

were otherwise negative.





Past Medical & Surgical


Medical Problems:


(1) Advance care planning


(2) Closed right hip fracture


(3) Depression


(4) Seizure disorder








Family History





Patient reports no known family medical history.





Social History


Smoking Status:  Never Smoker


Drug Use:  none


Marital Status:  


Occupation Status:  retired





Current/Historical Medications


Scheduled


Fluoxetine (Prozac), 100 MG PO HS


Lamotrigine (Lamictal), 100 MG PO HS


Prednisone (Prednisone), 2 TAB PO DAILY





Allergies


Coded Allergies:  


     No Known Allergies (Unverified , 1/8/18)





Physical Exam


Vital Signs











  Date Time  Temp Pulse Resp B/P (MAP) Pulse Ox O2 Delivery O2 Flow Rate FiO2


 


1/8/18 07:17  83 18 140/79    


 


1/8/18 06:16  91      


 


1/8/18 06:12  94 20 155/86 95 Room Air  


 


1/8/18 05:15 36.9 108 18 131/79 94 Room Air  











Physical Exam


GENERAL: alert, anxious appearing, well nourished, non-toxic 


EYE EXAM: normal conjunctiva, PERRL and EOM's grossly intact


OROPHARYNX: no exudate, no erythema, lips, buccal mucosa, and tongue normal and 

mucous membranes are moist


NECK: supple, no nuchal rigidity, no adenopathy, non-tender


LUNGS: Clear to auscultation. Normal chest wall mechanics


HEART: no murmurs, S1 normal and S2 normal 


ABDOMEN: abdomen soft, non-tender, normo-active bowel sounds, no masses, no 

rebound or guarding. 


BACK: Back is symmetrical on inspection and there is no deformity, no midline 

tenderness, no CVA tenderness. 


SKIN: no rashes and no bruising 


UPPER EXTREMITIES: upper extremities are grossly normal. 


LOWER EXTREMITIES: No pitting edema.


NEURO EXAM: Normal sensorium





Medical Decision & Procedures


ER Provider


Diagnostic Interpretation:





CHEST X-RAY: 





 A study of the chest was reviewed and was negative for infiltrate, effusion, 

pneumothorax, or wide mediastinum.  NO CM. 





ABDOMINAL X-RAY: 





NO obvious SBO, No free air. Loose stool throughout. Surgical clip noted. 

Scoliosis noted. 





BILIARY ULTRASOUND





CLINICAL HISTORY: abnormal LFTs    





COMPARISON STUDY:  No previous studies for comparison.





FINDINGS: The pancreas is poorly visualized. There is no right-sided


hydronephrosis. There is a 1 cm right renal cyst. The gallbladder surgically


absent. The common bile duct measures 8 mm. No focal hepatic masses were


visualized. The portal triads were slightly echogenic. This is a nonspecific


finding which has been reported in hepatitis, heart failure, fasting patients,


as well as other rarer entities.





IMPRESSION:  


1. Nondiagnostic evaluation the pancreas


2. Surgically absent gallbladder


3. 8 mm common bile duct


4. Increased echogenicity of the portal triads, a nonspecific finding. 














Electronically signed by:  Yasir Foster M.D.


1/8/2018 7:11 AM





Dictated Date/Time:  1/8/2018 7:06 AM





Laboratory Results


1/8/18 05:30








Red Blood Count 5.29, Mean Corpuscular Volume 85.1, Mean Corpuscular Hemoglobin 

28.7, Mean Corpuscular Hemoglobin Concent 33.8, Mean Platelet Volume 9.6, 

Neutrophils (%) (Auto) 69.4, Lymphocytes (%) (Auto) 17.2, Monocytes (%) (Auto) 

13.1, Eosinophils (%) (Auto) 0.0, Basophils (%) (Auto) 0.1, Neutrophils # (Auto

) 6.08, Lymphocytes # (Auto) 1.51, Monocytes # (Auto) 1.15, Eosinophils # (Auto

) 0.00, Basophils # (Auto) 0.01





1/8/18 05:30

















Test


  1/8/18


05:30 1/8/18


05:42


 


White Blood Count


  8.77 K/uL


(4.8-10.8) 


 


 


Red Blood Count


  5.29 M/uL


(4.2-5.4) 


 


 


Hemoglobin


  15.2 g/dL


(12.0-16.0) 


 


 


Hematocrit 45.0 % (37-47)  


 


Mean Corpuscular Volume


  85.1 fL


() 


 


 


Mean Corpuscular Hemoglobin


  28.7 pg


(25-34) 


 


 


Mean Corpuscular Hemoglobin


Concent 33.8 g/dl


(32-36) 


 


 


Platelet Count


  262 K/uL


(130-400) 


 


 


Mean Platelet Volume


  9.6 fL


(7.4-10.4) 


 


 


Neutrophils (%) (Auto) 69.4 %  


 


Lymphocytes (%) (Auto) 17.2 %  


 


Monocytes (%) (Auto) 13.1 %  


 


Eosinophils (%) (Auto) 0.0 %  


 


Basophils (%) (Auto) 0.1 %  


 


Neutrophils # (Auto)


  6.08 K/uL


(1.4-6.5) 


 


 


Lymphocytes # (Auto)


  1.51 K/uL


(1.2-3.4) 


 


 


Monocytes # (Auto)


  1.15 K/uL


(0.11-0.59) 


 


 


Eosinophils # (Auto)


  0.00 K/uL


(0-0.5) 


 


 


Basophils # (Auto)


  0.01 K/uL


(0-0.2) 


 


 


RDW Standard Deviation


  40.8 fL


(36.4-46.3) 


 


 


RDW Coefficient of Variation


  13.1 %


(11.5-14.5) 


 


 


Immature Granulocyte % (Auto) 0.2 %  


 


Immature Granulocyte # (Auto)


  0.02 K/uL


(0.00-0.02) 


 


 


Prothrombin Time


  9.9 SECONDS


(9.0-12.0) 


 


 


Prothromb Time International


Ratio 0.9 (0.9-1.1) 


  


 


 


Anion Gap


  10.0 mmol/L


(3-11) 


 


 


Est Creatinine Clear Calc


Drug Dose 47.8 ml/min 


  


 


 


Estimated GFR (


American) 54.4 


  


 


 


Estimated GFR (Non-


American 46.9 


  


 


 


BUN/Creatinine Ratio 19.3 (10-20)  


 


Calcium Level


  9.2 mg/dl


(8.5-10.1) 


 


 


Magnesium Level


  2.0 mg/dl


(1.8-2.4) 


 


 


Total Bilirubin


  0.4 mg/dl


(0.2-1) 


 


 


Aspartate Amino Transf


(AST/SGOT) 183 U/L


(15-37) 


 


 


Alanine Aminotransferase


(ALT/SGPT) 326 U/L


(12-78) 


 


 


Alkaline Phosphatase


  82 U/L


() 


 


 


Troponin I


  < 0.015 ng/ml


(0-0.045) 


 


 


Total Protein


  7.6 gm/dl


(6.4-8.2) 


 


 


Albumin


  3.4 gm/dl


(3.4-5.0) 


 


 


Globulin


  4.2 gm/dl


(2.5-4.0) 


 


 


Albumin/Globulin Ratio 0.8 (0.9-2)  


 


Lipase


  82 U/L


() 


 


 


Bedside Lactic Acid Venous


  


  1.68 mmol/L


(0.90-1.70)





Laboratory results per my review.





Medications Administered











 Medications


  (Trade)  Dose


 Ordered  Sig/Saima


 Route  Start Time


 Stop Time Status Last Admin


Dose Admin


 


 Sodium Chloride  500 ml @ 


 999 mls/hr  Q31M STAT


 IV  1/8/18 05:34


 1/8/18 06:04 DC 1/8/18 05:46


999 MLS/HR


 


 Pantoprazole


 Sodium 40 mg/


 Syringe  10 ml @ 5


 mls/min  NOW  ONCE


 IV  1/8/18 05:45


 1/8/18 05:46 DC 1/8/18 06:09


5 MLS/MIN


 


 Ondansetron HCl


  (Zofran Inj)  4 mg  NOW  STAT


 IV  1/8/18 05:34


 1/8/18 05:37 DC 1/8/18 05:46


4 MG


 


 Al Hydroxide/Mg


 Hydroxide


  (Maalox Susp)  15 ml  NOW  STAT


 PO  1/8/18 05:42


 1/8/18 05:43 DC 1/8/18 05:46


15 ML


 


 Sucralfate


  (Carafate Susp)  1 gm  NOW  STAT


 PO  1/8/18 07:27


 1/8/18 07:28 DC 1/8/18 07:44


1 GM











ECG


Indication:  vomiting


Rate (beats per minute):  91


Rhythm:  normal sinus


Findings:  no acute ischemic change, no ectopy, other (Normal Intervals)


Comparison ECG Date:  4/17/2017


Change:  no significant change





ED Course


0526: The patient was evaluated in room B11. A complete history and physical 

exam was performed. 





0534: Ordered Zofran 4 mg IV, Sodium Chloride 500 mL @ 999 mL/hr IV. 





0542: Ordered Maalox 15 mL PO. 





0545: Ordered Pantoprazole 10 mL @ 5 mLs/min IV.





Medical Decision


Differential diagnosis:


Etiologies such as gastroenteritis, food borne illness, infections, appendicitis

, diverticulitis, inflammatory bowel disease, obstruction, GI bleed, biliary 

pathology, as well as others were entertained.





Medication Reconcilliation


Current Medication List:  was personally reviewed by me





Blood Pressure Screening


Patient's blood pressure:  Elevated blood pressure


Blood pressure disposition:  Elevated BP felt to be situational





Impression





 Primary Impression:  


 Vomiting


 Additional Impressions:  


 Abdominal pain


 GI bleed


 Gastritis





Scribe Attestation


The scribe's documentation has been prepared under my direction and personally 

reviewed by me in its entirety. I confirm that the note above accurately 

reflects all work, treatment, procedures, and medical decision making performed 

by me.





Departure Information


Dispostion


Home / Self-Care





Referrals


Maribel Fragoso C.R.NAnaPAna (PCP)





Patient Instructions


My [facility]





Additional Instructions





Please call and follow-up with your family doctor.  Do not take any more 

steroids and do not use aspirin or other antiinflammatories such as ibuprofen/

advil/alleve/motrin until you are seen and your symptoms resolve.  Please take 

the stomach medication as prescribed.  Please avoid any additional acidic foods 

in your diet such as alcohol, coffee, tomato based products, citrus fruits.  If 

you have any worsening pain, recurrent vomiting, notice black or bloody stools, 

develop dizziness, worsening weakness, chest pain, or you have any other new 

concerns, please return to the emergency room.  Please have your family doctor 

recheck your liver numbers as they were elevated here today.  There can be many 

reasons for this including a side effects of medications.





Problem Qualifiers








 Primary Impression:  


 Vomiting


 Vomiting type:  unspecified  Vomiting Intractability:  non-intractable  Nausea 

presence:  with nausea  Qualified Codes:  R11.2 - Nausea with vomiting, 

unspecified


 Additional Impressions:  


 Abdominal pain


 Abdominal location:  epigastric  Qualified Codes:  R10.13 - Epigastric pain


 GI bleed


 GI bleed type/associated pathology:  unspecified gastrointestinal hemorrhage 

type  Qualified Codes:  K92.2 - Gastrointestinal hemorrhage, unspecified


 Gastritis


 Gastritis type:  other gastritis  Chronicity:  acute  Gastritis bleeding:  

with bleeding  Qualified Codes:  K29.01 - Acute gastritis with bleeding

## 2018-01-08 NOTE — DIAGNOSTIC IMAGING REPORT
BILIARY ULTRASOUND



CLINICAL HISTORY: abnormal LFTs    



COMPARISON STUDY:  No previous studies for comparison.



FINDINGS: The pancreas is poorly visualized. There is no right-sided

hydronephrosis. There is a 1 cm right renal cyst. The gallbladder surgically

absent. The common bile duct measures 8 mm. No focal hepatic masses were

visualized. The portal triads were slightly echogenic. This is a nonspecific

finding which has been reported in hepatitis, heart failure, fasting patients,

as well as other rarer entities.



IMPRESSION:  

1. Nondiagnostic evaluation the pancreas

2. Surgically absent gallbladder

3. 8 mm common bile duct

4. Increased echogenicity of the portal triads, a nonspecific finding. 









Electronically signed by:  Yasir Foster M.D.

1/8/2018 7:11 AM



Dictated Date/Time:  1/8/2018 7:06 AM